# Patient Record
Sex: FEMALE | Race: ASIAN | NOT HISPANIC OR LATINO | URBAN - METROPOLITAN AREA
[De-identification: names, ages, dates, MRNs, and addresses within clinical notes are randomized per-mention and may not be internally consistent; named-entity substitution may affect disease eponyms.]

---

## 2018-12-12 ENCOUNTER — OUTPATIENT (OUTPATIENT)
Dept: OUTPATIENT SERVICES | Facility: HOSPITAL | Age: 28
LOS: 1 days | End: 2018-12-12
Payer: COMMERCIAL

## 2018-12-12 DIAGNOSIS — Z3A.00 WEEKS OF GESTATION OF PREGNANCY NOT SPECIFIED: ICD-10-CM

## 2018-12-12 DIAGNOSIS — O26.899 OTHER SPECIFIED PREGNANCY RELATED CONDITIONS, UNSPECIFIED TRIMESTER: ICD-10-CM

## 2018-12-12 LAB — AMNISURE ROM (RUPTURE OF MEMBRANES): NEGATIVE — SIGNIFICANT CHANGE UP

## 2018-12-12 PROCEDURE — 94760 N-INVAS EAR/PLS OXIMETRY 1: CPT

## 2018-12-12 PROCEDURE — 99214 OFFICE O/P EST MOD 30 MIN: CPT

## 2018-12-12 PROCEDURE — 76818 FETAL BIOPHYS PROFILE W/NST: CPT

## 2018-12-12 PROCEDURE — 84112 EVAL AMNIOTIC FLUID PROTEIN: CPT

## 2019-01-02 ENCOUNTER — INPATIENT (INPATIENT)
Facility: HOSPITAL | Age: 29
LOS: 1 days | Discharge: HOME | End: 2019-01-04
Attending: OBSTETRICS & GYNECOLOGY | Admitting: OBSTETRICS & GYNECOLOGY
Payer: COMMERCIAL

## 2019-01-02 VITALS — HEIGHT: 64 IN | WEIGHT: 171.96 LBS

## 2019-01-02 LAB
ALBUMIN SERPL ELPH-MCNC: 3.7 G/DL — SIGNIFICANT CHANGE UP (ref 3.3–5)
ALP SERPL-CCNC: 194 U/L — HIGH (ref 40–120)
ALT FLD-CCNC: 14 U/L — SIGNIFICANT CHANGE UP (ref 10–45)
ANION GAP SERPL CALC-SCNC: 16 MMOL/L — SIGNIFICANT CHANGE UP (ref 5–17)
APPEARANCE UR: CLEAR — SIGNIFICANT CHANGE UP
APTT BLD: 28.3 SEC — SIGNIFICANT CHANGE UP (ref 27.5–36.3)
AST SERPL-CCNC: 20 U/L — SIGNIFICANT CHANGE UP (ref 10–40)
BASOPHILS NFR BLD AUTO: 0.3 % — SIGNIFICANT CHANGE UP (ref 0–2)
BILIRUB SERPL-MCNC: 0.3 MG/DL — SIGNIFICANT CHANGE UP (ref 0.2–1.2)
BILIRUB UR-MCNC: NEGATIVE — SIGNIFICANT CHANGE UP
BUN SERPL-MCNC: 6 MG/DL — LOW (ref 7–23)
CALCIUM SERPL-MCNC: 9.7 MG/DL — SIGNIFICANT CHANGE UP (ref 8.4–10.5)
CHLORIDE SERPL-SCNC: 102 MMOL/L — SIGNIFICANT CHANGE UP (ref 96–108)
CO2 SERPL-SCNC: 23 MMOL/L — SIGNIFICANT CHANGE UP (ref 22–31)
COLOR SPEC: YELLOW — SIGNIFICANT CHANGE UP
CREAT ?TM UR-MCNC: 76 MG/DL — SIGNIFICANT CHANGE UP
CREAT SERPL-MCNC: 0.54 MG/DL — SIGNIFICANT CHANGE UP (ref 0.5–1.3)
DIFF PNL FLD: NEGATIVE — SIGNIFICANT CHANGE UP
EOSINOPHIL NFR BLD AUTO: 0.7 % — SIGNIFICANT CHANGE UP (ref 0–6)
FIBRINOGEN PPP-MCNC: 575 MG/DL — HIGH (ref 258–438)
GLUCOSE SERPL-MCNC: 92 MG/DL — SIGNIFICANT CHANGE UP (ref 70–99)
GLUCOSE UR QL: NEGATIVE — SIGNIFICANT CHANGE UP
HCT VFR BLD CALC: 41.2 % — SIGNIFICANT CHANGE UP (ref 34.5–45)
HGB BLD-MCNC: 13.8 G/DL — SIGNIFICANT CHANGE UP (ref 11.5–15.5)
INR BLD: 0.83 — LOW (ref 0.88–1.16)
KETONES UR-MCNC: NEGATIVE — SIGNIFICANT CHANGE UP
LDH SERPL L TO P-CCNC: 210 U/L — SIGNIFICANT CHANGE UP (ref 50–242)
LEUKOCYTE ESTERASE UR-ACNC: ABNORMAL
LYMPHOCYTES # BLD AUTO: 13.5 % — SIGNIFICANT CHANGE UP (ref 13–44)
MCHC RBC-ENTMCNC: 29.1 PG — SIGNIFICANT CHANGE UP (ref 27–34)
MCHC RBC-ENTMCNC: 33.5 G/DL — SIGNIFICANT CHANGE UP (ref 32–36)
MCV RBC AUTO: 86.9 FL — SIGNIFICANT CHANGE UP (ref 80–100)
MONOCYTES NFR BLD AUTO: 6.8 % — SIGNIFICANT CHANGE UP (ref 2–14)
NEUTROPHILS NFR BLD AUTO: 78.7 % — HIGH (ref 43–77)
NITRITE UR-MCNC: NEGATIVE — SIGNIFICANT CHANGE UP
PH UR: 7.5 — SIGNIFICANT CHANGE UP (ref 5–8)
PLATELET # BLD AUTO: 192 K/UL — SIGNIFICANT CHANGE UP (ref 150–400)
POTASSIUM SERPL-MCNC: 4.2 MMOL/L — SIGNIFICANT CHANGE UP (ref 3.5–5.3)
POTASSIUM SERPL-SCNC: 4.2 MMOL/L — SIGNIFICANT CHANGE UP (ref 3.5–5.3)
PROT ?TM UR-MCNC: 18 MG/DL — HIGH (ref 0–12)
PROT SERPL-MCNC: 7.2 G/DL — SIGNIFICANT CHANGE UP (ref 6–8.3)
PROT UR-MCNC: NEGATIVE MG/DL — SIGNIFICANT CHANGE UP
PROT/CREAT UR-RTO: 0.2 RATIO — SIGNIFICANT CHANGE UP (ref 0–0.2)
PROTHROM AB SERPL-ACNC: 9.3 SEC — LOW (ref 10–12.9)
RBC # BLD: 4.74 M/UL — SIGNIFICANT CHANGE UP (ref 3.8–5.2)
RBC # FLD: 14.5 % — SIGNIFICANT CHANGE UP (ref 10.3–16.9)
RH IG SCN BLD-IMP: POSITIVE — SIGNIFICANT CHANGE UP
SODIUM SERPL-SCNC: 141 MMOL/L — SIGNIFICANT CHANGE UP (ref 135–145)
SP GR SPEC: 1.02 — SIGNIFICANT CHANGE UP (ref 1–1.03)
URATE SERPL-MCNC: 4.4 MG/DL — SIGNIFICANT CHANGE UP (ref 2.5–7)
UROBILINOGEN FLD QL: 1 E.U./DL — SIGNIFICANT CHANGE UP
WBC # BLD: 10 K/UL — SIGNIFICANT CHANGE UP (ref 3.8–10.5)
WBC # FLD AUTO: 10 K/UL — SIGNIFICANT CHANGE UP (ref 3.8–10.5)

## 2019-01-02 RX ORDER — OXYTOCIN 10 UNIT/ML
333.33 VIAL (ML) INJECTION
Qty: 20 | Refills: 0 | Status: DISCONTINUED | OUTPATIENT
Start: 2019-01-02 | End: 2019-01-03

## 2019-01-02 RX ORDER — OXYTOCIN 10 UNIT/ML
1 VIAL (ML) INJECTION
Qty: 30 | Refills: 0 | Status: DISCONTINUED | OUTPATIENT
Start: 2019-01-02 | End: 2019-01-02

## 2019-01-02 RX ORDER — PENICILLIN G POTASSIUM 5000000 [IU]/1
2.5 POWDER, FOR SOLUTION INTRAMUSCULAR; INTRAPLEURAL; INTRATHECAL; INTRAVENOUS EVERY 4 HOURS
Qty: 0 | Refills: 0 | Status: DISCONTINUED | OUTPATIENT
Start: 2019-01-02 | End: 2019-01-03

## 2019-01-02 RX ORDER — FENTANYL/BUPIVACAINE/NS/PF 2MCG/ML-.1
250 PLASTIC BAG, INJECTION (ML) INJECTION
Qty: 0 | Refills: 0 | Status: DISCONTINUED | OUTPATIENT
Start: 2019-01-02 | End: 2019-01-02

## 2019-01-02 RX ORDER — SODIUM CHLORIDE 9 MG/ML
1000 INJECTION, SOLUTION INTRAVENOUS ONCE
Qty: 0 | Refills: 0 | Status: DISCONTINUED | OUTPATIENT
Start: 2019-01-02 | End: 2019-01-03

## 2019-01-02 RX ORDER — PENICILLIN G POTASSIUM 5000000 [IU]/1
5 POWDER, FOR SOLUTION INTRAMUSCULAR; INTRAPLEURAL; INTRATHECAL; INTRAVENOUS ONCE
Qty: 0 | Refills: 0 | Status: COMPLETED | OUTPATIENT
Start: 2019-01-02 | End: 2019-01-02

## 2019-01-02 RX ORDER — PENICILLIN G POTASSIUM 5000000 [IU]/1
POWDER, FOR SOLUTION INTRAMUSCULAR; INTRAPLEURAL; INTRATHECAL; INTRAVENOUS
Qty: 0 | Refills: 0 | Status: DISCONTINUED | OUTPATIENT
Start: 2019-01-02 | End: 2019-01-03

## 2019-01-02 RX ORDER — OXYTOCIN 10 UNIT/ML
1 VIAL (ML) INJECTION
Qty: 30 | Refills: 0 | Status: DISCONTINUED | OUTPATIENT
Start: 2019-01-02 | End: 2019-01-04

## 2019-01-02 RX ORDER — SODIUM CHLORIDE 9 MG/ML
1000 INJECTION, SOLUTION INTRAVENOUS
Qty: 0 | Refills: 0 | Status: DISCONTINUED | OUTPATIENT
Start: 2019-01-02 | End: 2019-01-03

## 2019-01-02 RX ADMIN — PENICILLIN G POTASSIUM 100 MILLION UNIT(S): 5000000 POWDER, FOR SOLUTION INTRAMUSCULAR; INTRAPLEURAL; INTRATHECAL; INTRAVENOUS at 21:04

## 2019-01-02 RX ADMIN — PENICILLIN G POTASSIUM 100 MILLION UNIT(S): 5000000 POWDER, FOR SOLUTION INTRAMUSCULAR; INTRAPLEURAL; INTRATHECAL; INTRAVENOUS at 13:06

## 2019-01-02 RX ADMIN — PENICILLIN G POTASSIUM 100 MILLION UNIT(S): 5000000 POWDER, FOR SOLUTION INTRAMUSCULAR; INTRAPLEURAL; INTRATHECAL; INTRAVENOUS at 17:10

## 2019-01-02 RX ADMIN — SODIUM CHLORIDE 125 MILLILITER(S): 9 INJECTION, SOLUTION INTRAVENOUS at 13:16

## 2019-01-02 RX ADMIN — Medication 1 MILLIUNIT(S)/MIN: at 13:15

## 2019-01-03 ENCOUNTER — TRANSCRIPTION ENCOUNTER (OUTPATIENT)
Age: 29
End: 2019-01-03

## 2019-01-03 LAB — T PALLIDUM AB TITR SER: NEGATIVE — SIGNIFICANT CHANGE UP

## 2019-01-03 RX ORDER — HYDROCORTISONE 1 %
1 OINTMENT (GRAM) TOPICAL EVERY 4 HOURS
Qty: 0 | Refills: 0 | Status: DISCONTINUED | OUTPATIENT
Start: 2019-01-03 | End: 2019-01-04

## 2019-01-03 RX ORDER — SIMETHICONE 80 MG/1
80 TABLET, CHEWABLE ORAL EVERY 6 HOURS
Qty: 0 | Refills: 0 | Status: DISCONTINUED | OUTPATIENT
Start: 2019-01-03 | End: 2019-01-04

## 2019-01-03 RX ORDER — IBUPROFEN 200 MG
600 TABLET ORAL EVERY 6 HOURS
Qty: 0 | Refills: 0 | Status: DISCONTINUED | OUTPATIENT
Start: 2019-01-03 | End: 2019-01-04

## 2019-01-03 RX ORDER — PRAMOXINE HYDROCHLORIDE 150 MG/15G
1 AEROSOL, FOAM RECTAL EVERY 4 HOURS
Qty: 0 | Refills: 0 | Status: DISCONTINUED | OUTPATIENT
Start: 2019-01-03 | End: 2019-01-04

## 2019-01-03 RX ORDER — DIPHENHYDRAMINE HCL 50 MG
25 CAPSULE ORAL EVERY 6 HOURS
Qty: 0 | Refills: 0 | Status: DISCONTINUED | OUTPATIENT
Start: 2019-01-03 | End: 2019-01-04

## 2019-01-03 RX ORDER — OXYTOCIN 10 UNIT/ML
41.67 VIAL (ML) INJECTION
Qty: 20 | Refills: 0 | Status: DISCONTINUED | OUTPATIENT
Start: 2019-01-03 | End: 2019-01-04

## 2019-01-03 RX ORDER — MAGNESIUM HYDROXIDE 400 MG/1
30 TABLET, CHEWABLE ORAL
Qty: 0 | Refills: 0 | Status: DISCONTINUED | OUTPATIENT
Start: 2019-01-03 | End: 2019-01-04

## 2019-01-03 RX ORDER — TETANUS TOXOID, REDUCED DIPHTHERIA TOXOID AND ACELLULAR PERTUSSIS VACCINE, ADSORBED 5; 2.5; 8; 8; 2.5 [IU]/.5ML; [IU]/.5ML; UG/.5ML; UG/.5ML; UG/.5ML
0.5 SUSPENSION INTRAMUSCULAR ONCE
Qty: 0 | Refills: 0 | Status: DISCONTINUED | OUTPATIENT
Start: 2019-01-03 | End: 2019-01-04

## 2019-01-03 RX ORDER — OXYCODONE AND ACETAMINOPHEN 5; 325 MG/1; MG/1
2 TABLET ORAL EVERY 6 HOURS
Qty: 0 | Refills: 0 | Status: DISCONTINUED | OUTPATIENT
Start: 2019-01-03 | End: 2019-01-04

## 2019-01-03 RX ORDER — SODIUM CHLORIDE 9 MG/ML
3 INJECTION INTRAMUSCULAR; INTRAVENOUS; SUBCUTANEOUS EVERY 8 HOURS
Qty: 0 | Refills: 0 | Status: DISCONTINUED | OUTPATIENT
Start: 2019-01-03 | End: 2019-01-04

## 2019-01-03 RX ORDER — GLYCERIN ADULT
1 SUPPOSITORY, RECTAL RECTAL AT BEDTIME
Qty: 0 | Refills: 0 | Status: DISCONTINUED | OUTPATIENT
Start: 2019-01-03 | End: 2019-01-04

## 2019-01-03 RX ORDER — DOCUSATE SODIUM 100 MG
100 CAPSULE ORAL
Qty: 0 | Refills: 0 | Status: DISCONTINUED | OUTPATIENT
Start: 2019-01-03 | End: 2019-01-04

## 2019-01-03 RX ORDER — DIBUCAINE 1 %
1 OINTMENT (GRAM) RECTAL EVERY 4 HOURS
Qty: 0 | Refills: 0 | Status: DISCONTINUED | OUTPATIENT
Start: 2019-01-03 | End: 2019-01-04

## 2019-01-03 RX ORDER — ACETAMINOPHEN 500 MG
650 TABLET ORAL EVERY 6 HOURS
Qty: 0 | Refills: 0 | Status: DISCONTINUED | OUTPATIENT
Start: 2019-01-03 | End: 2019-01-04

## 2019-01-03 RX ORDER — AER TRAVELER 0.5 G/1
1 SOLUTION RECTAL; TOPICAL EVERY 4 HOURS
Qty: 0 | Refills: 0 | Status: DISCONTINUED | OUTPATIENT
Start: 2019-01-03 | End: 2019-01-04

## 2019-01-03 RX ORDER — LANOLIN
1 OINTMENT (GRAM) TOPICAL EVERY 6 HOURS
Qty: 0 | Refills: 0 | Status: DISCONTINUED | OUTPATIENT
Start: 2019-01-03 | End: 2019-01-04

## 2019-01-03 RX ADMIN — OXYCODONE AND ACETAMINOPHEN 2 TABLET(S): 5; 325 TABLET ORAL at 08:10

## 2019-01-03 RX ADMIN — Medication 125 MILLIUNIT(S)/MIN: at 01:44

## 2019-01-03 RX ADMIN — SODIUM CHLORIDE 3 MILLILITER(S): 9 INJECTION INTRAMUSCULAR; INTRAVENOUS; SUBCUTANEOUS at 06:06

## 2019-01-03 RX ADMIN — Medication 600 MILLIGRAM(S): at 18:00

## 2019-01-03 RX ADMIN — Medication 600 MILLIGRAM(S): at 06:19

## 2019-01-03 RX ADMIN — Medication 1 APPLICATION(S): at 06:19

## 2019-01-03 RX ADMIN — Medication 600 MILLIGRAM(S): at 07:10

## 2019-01-03 RX ADMIN — OXYCODONE AND ACETAMINOPHEN 2 TABLET(S): 5; 325 TABLET ORAL at 22:00

## 2019-01-03 RX ADMIN — SODIUM CHLORIDE 3 MILLILITER(S): 9 INJECTION INTRAMUSCULAR; INTRAVENOUS; SUBCUTANEOUS at 14:59

## 2019-01-03 RX ADMIN — OXYCODONE AND ACETAMINOPHEN 2 TABLET(S): 5; 325 TABLET ORAL at 08:52

## 2019-01-03 RX ADMIN — Medication 600 MILLIGRAM(S): at 13:28

## 2019-01-03 RX ADMIN — Medication 1 TABLET(S): at 12:28

## 2019-01-03 RX ADMIN — OXYCODONE AND ACETAMINOPHEN 2 TABLET(S): 5; 325 TABLET ORAL at 21:12

## 2019-01-03 RX ADMIN — Medication 600 MILLIGRAM(S): at 12:28

## 2019-01-03 RX ADMIN — Medication 100 MILLIGRAM(S): at 21:12

## 2019-01-03 RX ADMIN — Medication 100 MILLIGRAM(S): at 12:28

## 2019-01-03 NOTE — PROGRESS NOTE ADULT - SUBJECTIVE AND OBJECTIVE BOX
Patient evaluated at bedside.   She reports pain is well controlled with po pain meds  She denies headache, dizziness, chest pain, palpitations, shortness of breathe, nausea, vomiting, heavy vaginal bleeding or perineal discomfort.  She has been ambulating without assistance, voiding spontaneously, Feels soreness in perineum    Physical Exam:  Vital Signs Last 24 Hrs  T(C): 36.8 (03 Jan 2019 06:12), Max: 37.6 (03 Jan 2019 00:15)  T(F): 98.2 (03 Jan 2019 06:12), Max: 99.7 (03 Jan 2019 00:15)  HR: 79 (03 Jan 2019 06:12) (77 - 92)  BP: 109/70 (03 Jan 2019 06:12) (97/75 - 116/65)  BP(mean): --  RR: 17 (03 Jan 2019 06:12) (17 - 20)  SpO2: 99% (03 Jan 2019 06:12) (98% - 99%)    GA: NAD, A+0 x 3  Abd: + BS, soft, nontender, nondistended, no rebound or guarding, uterus firm at midline,   fb below umbilicus  : lochia WNL  Extremities: no swelling or calf tenderness, reflexes +2 bilaterally                          13.8   10.0  )-----------( 192      ( 02 Jan 2019 12:57 )             41.2     01-02    141  |  102  |  6<L>  ----------------------------<  92  4.2   |  23  |  0.54    Ca    9.7      02 Jan 2019 12:57    TPro  7.2  /  Alb  3.7  /  TBili  0.3  /  DBili  x   /  AST  20  /  ALT  14  /  AlkPhos  194<H>  01-02    PT/INR - ( 02 Jan 2019 12:57 )   PT: 9.3 sec;   INR: 0.83          PTT - ( 02 Jan 2019 12:57 )  PTT:28.3 sec

## 2019-01-03 NOTE — DISCHARGE NOTE OB - CARE PROVIDER_API CALL
Bety Bowser (DO; MS), Obstetrics and Gynecology  [address] [place] [address]  Phone: [phone]  Fax: [phone]

## 2019-01-03 NOTE — DISCHARGE NOTE OB - PATIENT PORTAL LINK FT
You can access the [facility] Patient Portal, offered by [facility], by registering with the following website: [email or web address]

## 2019-01-03 NOTE — DISCHARGE NOTE OB - PLAN OF CARE
discharge home patient to be discharged home. Nothing per vagina, no tampons, tub baths, intercourse. Patient to notify provider for fever >101, heavy vaginal bleeding, extreme abdominal pain. Patient to follow up in office in 6w

## 2019-01-03 NOTE — DISCHARGE NOTE OB - MEDICATION SUMMARY - MEDICATIONS TO TAKE
I will START or STAY ON the medications listed below when I get home from the hospital:    acetaminophen 325 mg oral tablet  -- 2 tab(s) by mouth every 6 hours, As needed, Temp greater or equal to 38.5C (101.3F), Mild Pain (1 - 3)  -- Indication: For Pain    ibuprofen 600 mg oral tablet  -- 1 tab(s) by mouth every 6 hours, As needed, Moderate Pain (4 - 6)  -- Indication: For Pain    lanolin topical ointment  -- 1 application on skin every 6 hours, As needed, Sore Nipples  -- Indication: For Breastfeeding    Prenatal 1 oral capsule  -- Indication: For Postpartum state    docusate sodium 100 mg oral capsule  -- 1 cap(s) by mouth 2 times a day, As needed, Stool Softening  -- Indication: For Constipation    simethicone 80 mg oral tablet, chewable  -- 1 tab(s) by mouth every 6 hours, As needed, Gas  -- Indication: For Gas

## 2019-01-03 NOTE — DISCHARGE NOTE OB - CARE PLAN
Principal Discharge DX:	Postpartum state  Goal:	discharge home  Assessment and plan of treatment:	patient to be discharged home. Nothing per vagina, no tampons, tub baths, intercourse. Patient to notify provider for fever >101, heavy vaginal bleeding, extreme abdominal pain. Patient to follow up in office in 6w

## 2019-01-04 VITALS
HEART RATE: 92 BPM | SYSTOLIC BLOOD PRESSURE: 146 MMHG | RESPIRATION RATE: 18 BRPM | DIASTOLIC BLOOD PRESSURE: 78 MMHG | TEMPERATURE: 99 F | OXYGEN SATURATION: 97 %

## 2019-01-04 LAB — SURGICAL PATHOLOGY STUDY: SIGNIFICANT CHANGE UP

## 2019-01-04 PROCEDURE — 36415 COLL VENOUS BLD VENIPUNCTURE: CPT

## 2019-01-04 PROCEDURE — 80053 COMPREHEN METABOLIC PANEL: CPT

## 2019-01-04 PROCEDURE — 85730 THROMBOPLASTIN TIME PARTIAL: CPT

## 2019-01-04 PROCEDURE — 86900 BLOOD TYPING SEROLOGIC ABO: CPT

## 2019-01-04 PROCEDURE — 83615 LACTATE (LD) (LDH) ENZYME: CPT

## 2019-01-04 PROCEDURE — 85025 COMPLETE CBC W/AUTO DIFF WBC: CPT

## 2019-01-04 PROCEDURE — 81001 URINALYSIS AUTO W/SCOPE: CPT

## 2019-01-04 PROCEDURE — 84550 ASSAY OF BLOOD/URIC ACID: CPT

## 2019-01-04 PROCEDURE — 86901 BLOOD TYPING SEROLOGIC RH(D): CPT

## 2019-01-04 PROCEDURE — 86780 TREPONEMA PALLIDUM: CPT

## 2019-01-04 PROCEDURE — 85610 PROTHROMBIN TIME: CPT

## 2019-01-04 PROCEDURE — 84156 ASSAY OF PROTEIN URINE: CPT

## 2019-01-04 PROCEDURE — 86850 RBC ANTIBODY SCREEN: CPT

## 2019-01-04 PROCEDURE — 88307 TISSUE EXAM BY PATHOLOGIST: CPT

## 2019-01-04 PROCEDURE — 85384 FIBRINOGEN ACTIVITY: CPT

## 2019-01-04 PROCEDURE — 82570 ASSAY OF URINE CREATININE: CPT

## 2019-01-04 RX ORDER — LANOLIN
1 OINTMENT (GRAM) TOPICAL
Qty: 0 | Refills: 0 | DISCHARGE
Start: 2019-01-04

## 2019-01-04 RX ORDER — DOCUSATE SODIUM 100 MG
1 CAPSULE ORAL
Qty: 0 | Refills: 0 | DISCHARGE
Start: 2019-01-04

## 2019-01-04 RX ORDER — ACETAMINOPHEN 500 MG
2 TABLET ORAL
Qty: 0 | Refills: 0 | DISCHARGE
Start: 2019-01-04

## 2019-01-04 RX ORDER — IBUPROFEN 200 MG
1 TABLET ORAL
Qty: 0 | Refills: 0 | DISCHARGE
Start: 2019-01-04

## 2019-01-04 RX ORDER — SIMETHICONE 80 MG/1
1 TABLET, CHEWABLE ORAL
Qty: 0 | Refills: 0 | DISCHARGE
Start: 2019-01-04

## 2019-01-04 RX ADMIN — Medication 600 MILLIGRAM(S): at 06:13

## 2019-01-04 RX ADMIN — Medication 600 MILLIGRAM(S): at 00:16

## 2019-01-04 RX ADMIN — Medication 600 MILLIGRAM(S): at 07:05

## 2019-01-04 RX ADMIN — Medication 1 TABLET(S): at 14:14

## 2019-01-04 RX ADMIN — Medication 600 MILLIGRAM(S): at 01:07

## 2019-01-04 NOTE — PROGRESS NOTE ADULT - ASSESSMENT
A/P yo 28y  s/p , PP#1 , stable  1. Pain: OPM  2. GI: Reg  3. :  Voiding  4. DVT prophylaxis: SCDs, ambulation  5. Dispo: PP#2 A/P yo 28y  s/p , PP#2 , stable  1. Pain: OPM  2. GI: Reg  3. :  Voiding  4. DVT prophylaxis: SCDs, ambulation  5. Dispo: PP#2

## 2019-01-04 NOTE — PROGRESS NOTE ADULT - SUBJECTIVE AND OBJECTIVE BOX
Patient evaluated at bedside.     She reports pain is well controlled.    She has been ambulating without assistance, voiding spontaneously, and is breastfeeding.    She denies HA, dizziness, chest pain, palpitations, shortness of breath, n/v, heavy vaginal bleeding or perineal discomfort.    Physical Exam:  Vital Signs Last 24 Hrs  T(C): 36.8 (04 Jan 2019 06:00), Max: 36.8 (04 Jan 2019 06:00)  T(F): 98.3 (04 Jan 2019 06:00), Max: 98.3 (04 Jan 2019 06:00)  HR: 82 (04 Jan 2019 06:00) (76 - 82)  BP: 98/59 (04 Jan 2019 06:00) (98/59 - 112/72)  BP(mean): --  RR: 16 (04 Jan 2019 06:00) (16 - 16)  SpO2: 97% (04 Jan 2019 06:00) (97% - 98%)    GA: NAD, A+0 x 3  Abd: + BS, soft, nontender, nondistended, no rebound or guarding, uterus firm at midline  : lochia WNL  Extremities: no edema or calf tenderness                          13.8   10.0  )-----------( 192      ( 02 Jan 2019 12:57 )             41.2     01-02    141  |  102  |  6<L>  ----------------------------<  92  4.2   |  23  |  0.54    Ca    9.7      02 Jan 2019 12:57    TPro  7.2  /  Alb  3.7  /  TBili  0.3  /  DBili  x   /  AST  20  /  ALT  14  /  AlkPhos  194<H>  01-02    PT/INR - ( 02 Jan 2019 12:57 )   PT: 9.3 sec;   INR: 0.83          PTT - ( 02 Jan 2019 12:57 )  PTT:28.3 sec

## 2019-01-04 NOTE — PROGRESS NOTE ADULT - ATTENDING COMMENTS
Patient seen and examined at bedside. Agree with above evaluation. Plan for discharge today PPD2 if patient remains hemodynamically stable

## 2019-01-07 DIAGNOSIS — Z3A.39 39 WEEKS GESTATION OF PREGNANCY: ICD-10-CM

## 2023-10-03 ENCOUNTER — APPOINTMENT (OUTPATIENT)
Dept: ANTEPARTUM | Facility: CLINIC | Age: 33
End: 2023-10-03
Payer: COMMERCIAL

## 2023-10-03 ENCOUNTER — TRANSCRIPTION ENCOUNTER (OUTPATIENT)
Age: 33
End: 2023-10-03

## 2023-10-03 ENCOUNTER — ASOB RESULT (OUTPATIENT)
Age: 33
End: 2023-10-03

## 2023-10-03 PROCEDURE — 76813 OB US NUCHAL MEAS 1 GEST: CPT

## 2023-10-03 PROCEDURE — 36415 COLL VENOUS BLD VENIPUNCTURE: CPT

## 2023-10-03 PROCEDURE — 93976 VASCULAR STUDY: CPT

## 2023-11-01 ENCOUNTER — APPOINTMENT (OUTPATIENT)
Dept: ANTEPARTUM | Facility: CLINIC | Age: 33
End: 2023-11-01
Payer: COMMERCIAL

## 2023-11-01 ENCOUNTER — ASOB RESULT (OUTPATIENT)
Age: 33
End: 2023-11-01

## 2023-11-01 PROCEDURE — 76805 OB US >/= 14 WKS SNGL FETUS: CPT

## 2023-12-05 ENCOUNTER — ASOB RESULT (OUTPATIENT)
Age: 33
End: 2023-12-05

## 2023-12-05 ENCOUNTER — APPOINTMENT (OUTPATIENT)
Dept: ANTEPARTUM | Facility: CLINIC | Age: 33
End: 2023-12-05
Payer: COMMERCIAL

## 2023-12-05 PROCEDURE — 76811 OB US DETAILED SNGL FETUS: CPT

## 2023-12-28 PROBLEM — Z00.00 ENCOUNTER FOR PREVENTIVE HEALTH EXAMINATION: Status: ACTIVE | Noted: 2023-12-28

## 2024-01-29 ENCOUNTER — APPOINTMENT (OUTPATIENT)
Dept: MATERNAL FETAL MEDICINE | Facility: CLINIC | Age: 34
End: 2024-01-29
Payer: COMMERCIAL

## 2024-01-29 PROCEDURE — 99205 OFFICE O/P NEW HI 60 MIN: CPT | Mod: 95

## 2024-01-29 NOTE — DISCUSSION/SUMMARY
[FreeTextEntry1] : Gestational Diabetes: The patient was counseled that maintaining good glycemic control is the key intervention for reducing the frequency and/or severity of complications related to gestational diabetes mellitus (GDM). We reviewed the risks associated with GDM including macrosomia/LGA infant, preeclampsia, polyhydramnios, stillbirth and  morbidities (such as hypoglycemia, hyperbilirubinemia, electrolyte abnormalities, polycythemia, respiratory distress and cardiomyopathy). Risks associated with GDM beyond the pregnancy and  period were also briefly discussed including development of obesity, impaired glucose tolerance, or metabolic syndrome. Fortunately, with good glucose control in pregnancy all these risks can be reduced to near non-GDM levels. GDM is also a strong marker for maternal development of type 2 diabetes. She was counseled that lifestyle interventions, which she is already instituting, such as dietary changes and exercise can reduce her lifetime risk of DM. Exercise in pregnancy was encouraged, and follow-up with the nutritionist as needed.   Recommendations: There is no current indication for insulin therapy. The patient was referred to a nutritionist. The patient will follow up with me in two weeks for review of her glycemic control

## 2024-01-29 NOTE — HISTORY OF PRESENT ILLNESS
[Home] : at home, [unfilled] , at the time of the visit. [Medical Office: ([facility])___] : at the medical office located in  [Verbal consent obtained from patient] : the patient, [unfilled] [FreeTextEntry1] :  Ms. Huggins is a 32 yo  at 28 4/7 wga (ANA 4/15/24) presents for MFM consultation. Her self reported ethnicity is .  Her OB history is as follows: G1: , term, 8lbs 4 oz G2: current  She has a recent diagnosis of GDM and has been tracking her glycemic control for three weeks. She is tracking 2 hours after meals. She reports optimal control with her fasting and post prandial values. She is not currently working with a nutritionist.

## 2024-02-06 ENCOUNTER — APPOINTMENT (OUTPATIENT)
Dept: ANTEPARTUM | Facility: CLINIC | Age: 34
End: 2024-02-06
Payer: COMMERCIAL

## 2024-02-06 ENCOUNTER — ASOB RESULT (OUTPATIENT)
Age: 34
End: 2024-02-06

## 2024-02-06 DIAGNOSIS — O24.419 GESTATIONAL DIABETES MELLITUS IN PREGNANCY, UNSPECIFIED CONTROL: ICD-10-CM

## 2024-02-06 PROCEDURE — 76816 OB US FOLLOW-UP PER FETUS: CPT

## 2024-02-06 PROCEDURE — 76819 FETAL BIOPHYS PROFIL W/O NST: CPT

## 2024-02-06 PROCEDURE — 76820 UMBILICAL ARTERY ECHO: CPT | Mod: 59

## 2024-02-12 ENCOUNTER — APPOINTMENT (OUTPATIENT)
Age: 34
End: 2024-02-12

## 2024-02-13 ENCOUNTER — APPOINTMENT (OUTPATIENT)
Dept: MATERNAL FETAL MEDICINE | Facility: CLINIC | Age: 34
End: 2024-02-13
Payer: COMMERCIAL

## 2024-02-13 PROCEDURE — 99213 OFFICE O/P EST LOW 20 MIN: CPT | Mod: 95

## 2024-03-01 NOTE — DISCUSSION/SUMMARY
[FreeTextEntry1] :  Recommendations: There is no current indication for insulin therapy. The patient was referred to a nutritionist.

## 2024-03-01 NOTE — HISTORY OF PRESENT ILLNESS
[Home] : at home, [unfilled] , at the time of the visit. [Verbal consent obtained from patient] : the patient, [unfilled] [Medical Office: ([facility])___] : at the medical office located in  [FreeTextEntry1] :  Ms. Huggins is a 34 yo  at 30 5/7 wga (ANA 4/15/24) presents for MFM consultation. Her self reported ethnicity is .  Her OB history is as follows: G1: , term, 8lbs 4 oz G2: current  She has a recent diagnosis of GDM. She is tracking 2 hours after meals. She reports optimal control with her fasting and post prandial values. She is not currently working with a nutritionist.

## 2024-03-05 ENCOUNTER — APPOINTMENT (OUTPATIENT)
Dept: MATERNAL FETAL MEDICINE | Facility: CLINIC | Age: 34
End: 2024-03-05
Payer: COMMERCIAL

## 2024-03-05 PROCEDURE — 99214 OFFICE O/P EST MOD 30 MIN: CPT | Mod: 95

## 2024-03-05 NOTE — DISCUSSION/SUMMARY
[FreeTextEntry1] :  Recommendations: There is no current indication for insulin therapy. Planning for IOL at 39 wga.

## 2024-03-05 NOTE — HISTORY OF PRESENT ILLNESS
[Medical Office: ([facility])___] : at the medical office located in  [Home] : at home, [unfilled] , at the time of the visit. [Verbal consent obtained from patient] : the patient, [unfilled] [FreeTextEntry1] :  Ms. Huggins is a 32 yo  at 34 1/7 wga (ANA 4/15/24) presents for MFM consultation. Her self reported ethnicity is .  Her OB history is as follows: G1: , term, 8lbs 4 oz G2: current  She reports adequate glycemic control. She is not currently working with a nutritionist.

## 2024-03-19 ENCOUNTER — APPOINTMENT (OUTPATIENT)
Dept: ANTEPARTUM | Facility: CLINIC | Age: 34
End: 2024-03-19
Payer: COMMERCIAL

## 2024-03-19 ENCOUNTER — ASOB RESULT (OUTPATIENT)
Age: 34
End: 2024-03-19

## 2024-03-19 PROCEDURE — 76819 FETAL BIOPHYS PROFIL W/O NST: CPT

## 2024-03-19 PROCEDURE — 76820 UMBILICAL ARTERY ECHO: CPT | Mod: 59

## 2024-03-19 PROCEDURE — 76816 OB US FOLLOW-UP PER FETUS: CPT

## 2024-03-31 ENCOUNTER — INPATIENT (INPATIENT)
Facility: HOSPITAL | Age: 34
LOS: 1 days | Discharge: HOME | End: 2024-04-02
Attending: OBSTETRICS & GYNECOLOGY | Admitting: OBSTETRICS & GYNECOLOGY
Payer: COMMERCIAL

## 2024-03-31 VITALS
TEMPERATURE: 98 F | SYSTOLIC BLOOD PRESSURE: 124 MMHG | DIASTOLIC BLOOD PRESSURE: 77 MMHG | RESPIRATION RATE: 16 BRPM | HEART RATE: 89 BPM

## 2024-03-31 DIAGNOSIS — Z98.890 OTHER SPECIFIED POSTPROCEDURAL STATES: Chronic | ICD-10-CM

## 2024-03-31 DIAGNOSIS — O26.899 OTHER SPECIFIED PREGNANCY RELATED CONDITIONS, UNSPECIFIED TRIMESTER: ICD-10-CM

## 2024-03-31 LAB
BASOPHILS # BLD AUTO: 0.05 K/UL — SIGNIFICANT CHANGE UP (ref 0–0.2)
BASOPHILS NFR BLD AUTO: 0.4 % — SIGNIFICANT CHANGE UP (ref 0–2)
BLD GP AB SCN SERPL QL: NEGATIVE — SIGNIFICANT CHANGE UP
EOSINOPHIL # BLD AUTO: 0.07 K/UL — SIGNIFICANT CHANGE UP (ref 0–0.5)
EOSINOPHIL NFR BLD AUTO: 0.6 % — SIGNIFICANT CHANGE UP (ref 0–6)
GLUCOSE BLDC GLUCOMTR-MCNC: 90 MG/DL — SIGNIFICANT CHANGE UP (ref 70–99)
HCT VFR BLD CALC: 39.4 % — SIGNIFICANT CHANGE UP (ref 34.5–45)
HGB BLD-MCNC: 13.4 G/DL — SIGNIFICANT CHANGE UP (ref 11.5–15.5)
IMM GRANULOCYTES NFR BLD AUTO: 1 % — HIGH (ref 0–0.9)
LYMPHOCYTES # BLD AUTO: 1.59 K/UL — SIGNIFICANT CHANGE UP (ref 1–3.3)
LYMPHOCYTES # BLD AUTO: 13.8 % — SIGNIFICANT CHANGE UP (ref 13–44)
MCHC RBC-ENTMCNC: 28.9 PG — SIGNIFICANT CHANGE UP (ref 27–34)
MCHC RBC-ENTMCNC: 34 GM/DL — SIGNIFICANT CHANGE UP (ref 32–36)
MCV RBC AUTO: 85.1 FL — SIGNIFICANT CHANGE UP (ref 80–100)
MONOCYTES # BLD AUTO: 0.82 K/UL — SIGNIFICANT CHANGE UP (ref 0–0.9)
MONOCYTES NFR BLD AUTO: 7.1 % — SIGNIFICANT CHANGE UP (ref 2–14)
NEUTROPHILS # BLD AUTO: 8.86 K/UL — HIGH (ref 1.8–7.4)
NEUTROPHILS NFR BLD AUTO: 77.1 % — HIGH (ref 43–77)
NRBC # BLD: 0 /100 WBCS — SIGNIFICANT CHANGE UP (ref 0–0)
PLATELET # BLD AUTO: 201 K/UL — SIGNIFICANT CHANGE UP (ref 150–400)
RBC # BLD: 4.63 M/UL — SIGNIFICANT CHANGE UP (ref 3.8–5.2)
RBC # FLD: 15.1 % — HIGH (ref 10.3–14.5)
RH IG SCN BLD-IMP: POSITIVE — SIGNIFICANT CHANGE UP
RH IG SCN BLD-IMP: POSITIVE — SIGNIFICANT CHANGE UP
WBC # BLD: 11.5 K/UL — HIGH (ref 3.8–10.5)
WBC # FLD AUTO: 11.5 K/UL — HIGH (ref 3.8–10.5)

## 2024-03-31 RX ORDER — ALBUTEROL 90 UG/1
2 AEROSOL, METERED ORAL EVERY 6 HOURS
Refills: 0 | Status: DISCONTINUED | OUTPATIENT
Start: 2024-03-31 | End: 2024-04-02

## 2024-03-31 RX ORDER — TIOTROPIUM BROMIDE 18 UG/1
2 CAPSULE ORAL; RESPIRATORY (INHALATION) DAILY
Refills: 0 | Status: DISCONTINUED | OUTPATIENT
Start: 2024-03-31 | End: 2024-04-02

## 2024-03-31 RX ORDER — CETIRIZINE HYDROCHLORIDE 10 MG/1
1 TABLET ORAL
Refills: 0 | DISCHARGE

## 2024-03-31 RX ORDER — CITRIC ACID/SODIUM CITRATE 300-500 MG
15 SOLUTION, ORAL ORAL EVERY 6 HOURS
Refills: 0 | Status: DISCONTINUED | OUTPATIENT
Start: 2024-03-31 | End: 2024-03-31

## 2024-03-31 RX ORDER — SODIUM CHLORIDE 9 MG/ML
1000 INJECTION, SOLUTION INTRAVENOUS
Refills: 0 | Status: DISCONTINUED | OUTPATIENT
Start: 2024-03-31 | End: 2024-03-31

## 2024-03-31 RX ORDER — OXYCODONE HYDROCHLORIDE 5 MG/1
5 TABLET ORAL ONCE
Refills: 0 | Status: DISCONTINUED | OUTPATIENT
Start: 2024-03-31 | End: 2024-04-02

## 2024-03-31 RX ORDER — OXYTOCIN 10 UNIT/ML
333.33 VIAL (ML) INJECTION
Qty: 20 | Refills: 0 | Status: DISCONTINUED | OUTPATIENT
Start: 2024-03-31 | End: 2024-03-31

## 2024-03-31 RX ORDER — DIBUCAINE 1 %
1 OINTMENT (GRAM) RECTAL EVERY 6 HOURS
Refills: 0 | Status: DISCONTINUED | OUTPATIENT
Start: 2024-03-31 | End: 2024-04-02

## 2024-03-31 RX ORDER — CHLORHEXIDINE GLUCONATE 213 G/1000ML
1 SOLUTION TOPICAL DAILY
Refills: 0 | Status: DISCONTINUED | OUTPATIENT
Start: 2024-03-31 | End: 2024-03-31

## 2024-03-31 RX ORDER — SODIUM CHLORIDE 9 MG/ML
3 INJECTION INTRAMUSCULAR; INTRAVENOUS; SUBCUTANEOUS EVERY 8 HOURS
Refills: 0 | Status: DISCONTINUED | OUTPATIENT
Start: 2024-03-31 | End: 2024-04-02

## 2024-03-31 RX ORDER — AER TRAVELER 0.5 G/1
1 SOLUTION RECTAL; TOPICAL EVERY 4 HOURS
Refills: 0 | Status: DISCONTINUED | OUTPATIENT
Start: 2024-03-31 | End: 2024-04-02

## 2024-03-31 RX ORDER — OXYTOCIN 10 UNIT/ML
2 VIAL (ML) INJECTION
Qty: 30 | Refills: 0 | Status: DISCONTINUED | OUTPATIENT
Start: 2024-03-31 | End: 2024-04-02

## 2024-03-31 RX ORDER — BUDESONIDE AND FORMOTEROL FUMARATE DIHYDRATE 160; 4.5 UG/1; UG/1
2 AEROSOL RESPIRATORY (INHALATION)
Refills: 0 | Status: DISCONTINUED | OUTPATIENT
Start: 2024-03-31 | End: 2024-04-02

## 2024-03-31 RX ORDER — MAGNESIUM HYDROXIDE 400 MG/1
30 TABLET, CHEWABLE ORAL
Refills: 0 | Status: DISCONTINUED | OUTPATIENT
Start: 2024-03-31 | End: 2024-04-02

## 2024-03-31 RX ORDER — OXYTOCIN 10 UNIT/ML
41.67 VIAL (ML) INJECTION
Qty: 20 | Refills: 0 | Status: DISCONTINUED | OUTPATIENT
Start: 2024-03-31 | End: 2024-04-02

## 2024-03-31 RX ORDER — ACETAMINOPHEN 500 MG
975 TABLET ORAL
Refills: 0 | Status: DISCONTINUED | OUTPATIENT
Start: 2024-03-31 | End: 2024-04-02

## 2024-03-31 RX ORDER — HYDROCORTISONE 1 %
1 OINTMENT (GRAM) TOPICAL EVERY 6 HOURS
Refills: 0 | Status: DISCONTINUED | OUTPATIENT
Start: 2024-03-31 | End: 2024-04-02

## 2024-03-31 RX ORDER — KETOROLAC TROMETHAMINE 30 MG/ML
30 SYRINGE (ML) INJECTION ONCE
Refills: 0 | Status: DISCONTINUED | OUTPATIENT
Start: 2024-03-31 | End: 2024-03-31

## 2024-03-31 RX ORDER — FLUTICASONE FUROATE, UMECLIDINIUM BROMIDE AND VILANTEROL TRIFENATATE 200; 62.5; 25 UG/1; UG/1; UG/1
1 POWDER RESPIRATORY (INHALATION)
Refills: 0 | DISCHARGE

## 2024-03-31 RX ORDER — SIMETHICONE 80 MG/1
80 TABLET, CHEWABLE ORAL EVERY 4 HOURS
Refills: 0 | Status: DISCONTINUED | OUTPATIENT
Start: 2024-03-31 | End: 2024-04-02

## 2024-03-31 RX ORDER — IBUPROFEN 200 MG
600 TABLET ORAL EVERY 6 HOURS
Refills: 0 | Status: DISCONTINUED | OUTPATIENT
Start: 2024-03-31 | End: 2024-04-02

## 2024-03-31 RX ORDER — BENZOCAINE 10 %
1 GEL (GRAM) MUCOUS MEMBRANE EVERY 6 HOURS
Refills: 0 | Status: DISCONTINUED | OUTPATIENT
Start: 2024-03-31 | End: 2024-04-02

## 2024-03-31 RX ORDER — DIPHENHYDRAMINE HCL 50 MG
25 CAPSULE ORAL EVERY 6 HOURS
Refills: 0 | Status: DISCONTINUED | OUTPATIENT
Start: 2024-03-31 | End: 2024-04-02

## 2024-03-31 RX ORDER — TETANUS TOXOID, REDUCED DIPHTHERIA TOXOID AND ACELLULAR PERTUSSIS VACCINE, ADSORBED 5; 2.5; 8; 8; 2.5 [IU]/.5ML; [IU]/.5ML; UG/.5ML; UG/.5ML; UG/.5ML
0.5 SUSPENSION INTRAMUSCULAR ONCE
Refills: 0 | Status: DISCONTINUED | OUTPATIENT
Start: 2024-03-31 | End: 2024-04-02

## 2024-03-31 RX ORDER — FENTANYL/BUPIVACAINE/NS/PF 2MCG/ML-.1
250 PLASTIC BAG, INJECTION (ML) INJECTION
Refills: 0 | Status: DISCONTINUED | OUTPATIENT
Start: 2024-03-31 | End: 2024-04-02

## 2024-03-31 RX ORDER — PRAMOXINE HYDROCHLORIDE 150 MG/15G
1 AEROSOL, FOAM RECTAL EVERY 4 HOURS
Refills: 0 | Status: DISCONTINUED | OUTPATIENT
Start: 2024-03-31 | End: 2024-04-02

## 2024-03-31 RX ORDER — INFLUENZA VIRUS VACCINE 15; 15; 15; 15 UG/.5ML; UG/.5ML; UG/.5ML; UG/.5ML
0.5 SUSPENSION INTRAMUSCULAR ONCE
Refills: 0 | Status: DISCONTINUED | OUTPATIENT
Start: 2024-03-31 | End: 2024-04-02

## 2024-03-31 RX ORDER — FLUTICASONE FUROATE, UMECLIDINIUM BROMIDE AND VILANTEROL TRIFENATATE 200; 62.5; 25 UG/1; UG/1; UG/1
1 POWDER RESPIRATORY (INHALATION) DAILY
Refills: 0 | Status: DISCONTINUED | OUTPATIENT
Start: 2024-03-31 | End: 2024-03-31

## 2024-03-31 RX ORDER — LANOLIN
1 OINTMENT (GRAM) TOPICAL EVERY 6 HOURS
Refills: 0 | Status: DISCONTINUED | OUTPATIENT
Start: 2024-03-31 | End: 2024-04-02

## 2024-03-31 RX ORDER — IBUPROFEN 200 MG
600 TABLET ORAL EVERY 6 HOURS
Refills: 0 | Status: COMPLETED | OUTPATIENT
Start: 2024-03-31 | End: 2025-02-27

## 2024-03-31 RX ORDER — OXYCODONE HYDROCHLORIDE 5 MG/1
5 TABLET ORAL
Refills: 0 | Status: DISCONTINUED | OUTPATIENT
Start: 2024-03-31 | End: 2024-04-02

## 2024-03-31 RX ADMIN — SODIUM CHLORIDE 125 MILLILITER(S): 9 INJECTION, SOLUTION INTRAVENOUS at 14:10

## 2024-03-31 RX ADMIN — Medication 125 MILLIUNIT(S)/MIN: at 18:29

## 2024-03-31 RX ADMIN — Medication 2 MILLIUNIT(S)/MIN: at 15:03

## 2024-03-31 RX ADMIN — Medication 30 MILLIGRAM(S): at 19:23

## 2024-03-31 RX ADMIN — SODIUM CHLORIDE 3 MILLILITER(S): 9 INJECTION INTRAMUSCULAR; INTRAVENOUS; SUBCUTANEOUS at 22:00

## 2024-03-31 NOTE — OB RN DELIVERY SUMMARY - NSSELHIDDEN_OBGYN_ALL_OB_FT
[NS_DeliveryAttending1_OBGYN_ALL_OB_FT:[id number]==],[NS_DeliveryRN_OBGYN_ALL_OB_FT:[id number]]

## 2024-03-31 NOTE — OB RN DELIVERY SUMMARY - NS_DELIVERYROOM_OBGYN_ALL_OB_FT
CC: syncope (24 Aug 2020 08:04)    HPI: 83yo/M with PMH Parkinson's, HTN, BPH presented after syncopal episode. Patient is currently confused and not able to recall the history completely. Per chart, he stood up and synopsized.  No fever. NO head trauma reported. Patient denies having palpitations or chest pain. He admits he is not drinking enough fluids as does not want to void frequently. (23 Aug 2020 15:05)    INTERVAL HPI/ OVERNIGHT EVENTS:    Vital Signs Last 24 Hrs  T(C): 36.8 (24 Aug 2020 08:55), Max: 36.9 (23 Aug 2020 12:29)  T(F): 98.2 (24 Aug 2020 08:55), Max: 98.5 (23 Aug 2020 12:29)  HR: 59 (24 Aug 2020 08:55) (57 - 115)  BP: 158/76 (24 Aug 2020 08:55) (127/67 - 169/83)  BP(mean): 98 (23 Aug 2020 16:01) (98 - 98)  RR: 18 (24 Aug 2020 08:55) (13 - 19)  SpO2: 100% (24 Aug 2020 08:55) (98% - 100%)  I&O's Detail    23 Aug 2020 07:01  -  24 Aug 2020 07:00  --------------------------------------------------------  IN:    lactated ringers.: 700 mL    Oral Fluid: 120 mL  Total IN: 820 mL    OUT:    Voided: 225 mL  Total OUT: 225 mL    Total NET: 595 mL        REVIEW OF SYSTEMS:    CONSTITUTIONAL: No weakness, fevers or chills  EYES/ENT: No visual changes;  No vertigo or throat pain   NECK: No pain or stiffness  RESPIRATORY: No cough, wheezing, hemoptysis; No shortness of breath  CARDIOVASCULAR: No chest pain or palpitations  GASTROINTESTINAL: No abdominal or epigastric pain. No nausea, vomiting, or hematemesis; No diarrhea or constipation. No melena or hematochezia.  GENITOURINARY: No dysuria, frequency or hematuria  NEUROLOGICAL: No numbness or weakness  SKIN: No itching, burning, rashes, or lesions   All other review of systems is negative unless indicated above.  PHYSICAL EXAM:    General: Well developed; well nourished; in no acute distress  Eyes: PERRLA, EOMI; conjunctiva and sclera clear  Head: Normocephalic; atraumatic  ENMT: No nasal discharge; airway clear  Neck: Supple; non tender; no masses  Respiratory: No wheezes, rales or rhonchi  Cardiovascular: Regular rate and rhythm. S1 and S2 Normal; No murmurs, gallops or rubs  Gastrointestinal: Soft non-tender non-distended; Normal bowel sounds  Genitourinary: No  suprapubic  tenderness  Extremities: Normal range of motion, No clubbing, cyanosis or edema  Vascular: Peripheral pulses palpable 2+ bilaterally  Neurological: Alert and oriented x4  Skin: Warm and dry. No acute rash  Lymph Nodes: No acute cervical adenopathy  Musculoskeletal: Normal muscle tone, without deformities  Psychiatric: Cooperative and appropriate  CARDIAC MARKERS ( 23 Aug 2020 12:50 )  <0.015 ng/mL / x     / x     / x     / x                                11.6   5.13  )-----------( 174      ( 24 Aug 2020 08:38 )             35.4     24 Aug 2020 08:38    141    |  111    |  21     ----------------------------<  93     3.9     |  26     |  0.70     Ca    7.9        24 Aug 2020 08:38    TPro  6.7    /  Alb  3.4    /  TBili  0.6    /  DBili  x      /  AST  32     /  ALT  9      /  AlkPhos  111    23 Aug 2020 12:50    PT/INR - ( 23 Aug 2020 12:50 )   PT: 12.0 sec;   INR: 1.04 ratio         PTT - ( 23 Aug 2020 12:50 )  PTT:25.3 sec  CAPILLARY BLOOD GLUCOSE      POCT Blood Glucose.: 101 mg/dL (23 Aug 2020 12:29)    LIVER FUNCTIONS - ( 23 Aug 2020 12:50 )  Alb: 3.4 g/dL / Pro: 6.7 gm/dL / ALK PHOS: 111 U/L / ALT: 9 U/L / AST: 32 U/L / GGT: x           Urinalysis Basic - ( 23 Aug 2020 12:50 )    Color: Yellow / Appearance: Clear / S.010 / pH: x  Gluc: x / Ketone: Negative  / Bili: Negative / Urobili: Negative mg/dL   Blood: x / Protein: Negative mg/dL / Nitrite: Negative   Leuk Esterase: Trace / RBC: 0-2 /HPF / WBC 3-5   Sq Epi: x / Non Sq Epi: Moderate / Bacteria: Few        MEDICATIONS  (STANDING):  aspirin enteric coated 81 milliGRAM(s) Oral daily  atorvastatin 40 milliGRAM(s) Oral at bedtime  carbidopa/levodopa  25/100 1 Tablet(s) Oral three times a day  heparin   Injectable 5000 Unit(s) SubCutaneous every 12 hours  lactated ringers. 1000 milliLiter(s) (100 mL/Hr) IV Continuous <Continuous>  lisinopril 5 milliGRAM(s) Oral daily  metoprolol succinate ER 12.5 milliGRAM(s) Oral at bedtime  multivitamin 1 Tablet(s) Oral daily  pantoprazole    Tablet 40 milliGRAM(s) Oral before breakfast    MEDICATIONS  (PRN):  acetaminophen   Tablet .. 650 milliGRAM(s) Oral every 6 hours PRN Mild Pain (1 - 3)  aluminum hydroxide/magnesium hydroxide/simethicone Suspension 30 milliLiter(s) Oral every 4 hours PRN Dyspepsia  ondansetron Injectable 4 milliGRAM(s) IV Push every 6 hours PRN Nausea and/or Vomiting      RADIOLOGY & ADDITIONAL TESTS: CC: syncope (24 Aug 2020 08:04)    HPI:  83yo/M with PMH Parkinson's, HTN, BPH, Prostate CA  presented after syncopal episode Pt and wife report that  Pt was watching TV and was falling asleep couple of times, he woke up and then wife noted he passed out and was not responding, his eyes were closed and he was making yawning movements. Family called 911. Pt reports woke up in the ambulance. A sper ED records was confused.  Pt also reports that had few falls past 2 weeks, once aty night, and another time was getting up from sitting.  Felt lightheaded but denies Palpitations.     In ED:  Tachycardic, HR at 115, rest of vitals stable.  Labs  significant for azotemia. S/p IVF      INTERVAL HPI/ OVERNIGHT EVENTS: Pt was seen and examined at bedside after stress test, tolerated well, wife at bedside, results and further plan discussed. Pt reports that feels much better today       Vital Signs Last 24 Hrs  T(C): 36.8 (24 Aug 2020 08:55), Max: 36.9 (23 Aug 2020 12:29)  T(F): 98.2 (24 Aug 2020 08:55), Max: 98.5 (23 Aug 2020 12:29)  HR: 59 (24 Aug 2020 08:55) (57 - 115)  BP: 158/76 (24 Aug 2020 08:55) (127/67 - 169/83)  BP(mean): 98 (23 Aug 2020 16:01) (98 - 98)  RR: 18 (24 Aug 2020 08:55) (13 - 19)  SpO2: 100% (24 Aug 2020 08:55) (98% - 100%)      REVIEW OF SYSTEMS:  All other review of systems is negative unless indicated above.      PHYSICAL EXAM:  General: Well developed; malnourished; in no acute distress  Eyes: PERRLA, EOMI; conjunctiva and sclera clear  Head: Normocephalic; L orbital ecchymosis   ENMT: No nasal discharge; airway clear  Neck: Supple; non tender; no masses  Respiratory: No wheezes, rales or rhonchi  Cardiovascular: Regular rate and rhythm. S1 and S2 Normal;   Gastrointestinal: Soft non-tender non-distended; Normal bowel sounds  Genitourinary: No  suprapubic  tenderness  Extremities: Non pitting  edema  Vascular: Peripheral pulses palpable   Neurological: Alert and oriented x3, non focal,  forgetful, some bradykinesia   Skin: Warm and dry. No acute rash  Musculoskeletal: Normal muscle tone, without deformities  Psychiatric: Cooperative and appropriate      LABS:   CARDIAC MARKERS ( 23 Aug 2020 12:50 )  <0.015 ng/mL / x     / x     / x     / x                                11.6   5.13  )-----------( 174      ( 24 Aug 2020 08:38 )             35.4     24 Aug 2020 08:38    141    |  111    |  21     ----------------------------<  93     3.9     |  26     |  0.70     Ca    7.9        24 Aug 2020 08:38    TPro  6.7    /  Alb  3.4    /  TBili  0.6    /  DBili  x      /  AST  32     /  ALT  9      /  AlkPhos  111    23 Aug 2020 12:50  PT/INR - ( 23 Aug 2020 12:50 )   PT: 12.0 sec;   INR: 1.04 ratio    PTT - ( 23 Aug 2020 12:50 )  PTT:25.3 sec    POCT Blood Glucose.: 101 mg/dL (23 Aug 2020 12:29)    LIVER FUNCTIONS - ( 23 Aug 2020 12:50 )  Alb: 3.4 g/dL / Pro: 6.7 gm/dL / ALK PHOS: 111 U/L / ALT: 9 U/L / AST: 32 U/L / GGT: x           Urinalysis Basic - ( 23 Aug 2020 12:50 )  Color: Yellow / Appearance: Clear / S.010 / pH: x  Gluc: x / Ketone: Negative  / Bili: Negative / Urobili: Negative mg/dL   Blood: x / Protein: Negative mg/dL / Nitrite: Negative   Leuk Esterase: Trace / RBC: 0-2 /HPF / WBC 3-5   Sq Epi: x / Non Sq Epi: Moderate / Bacteria: Few    Culture - Blood (20 @ 12:51)    Specimen Source: .Blood Blood-Peripheral    Culture Results:   No growth to date.    Culture - Blood (20 @ 12:51)    Specimen Source: .Blood Blood-Peripheral    Culture Results:   No growth to date.        MEDICATIONS  (STANDING):  aspirin enteric coated 81 milliGRAM(s) Oral daily  atorvastatin 40 milliGRAM(s) Oral at bedtime  carbidopa/levodopa  25/100 1 Tablet(s) Oral three times a day  heparin   Injectable 5000 Unit(s) SubCutaneous every 12 hours  lactated ringers. 1000 milliLiter(s) (100 mL/Hr) IV Continuous <Continuous>  lisinopril 5 milliGRAM(s) Oral daily  metoprolol succinate ER 12.5 milliGRAM(s) Oral at bedtime  multivitamin 1 Tablet(s) Oral daily  pantoprazole    Tablet 40 milliGRAM(s) Oral before breakfast    MEDICATIONS  (PRN):  acetaminophen   Tablet .. 650 milliGRAM(s) Oral every 6 hours PRN Mild Pain (1 - 3)  aluminum hydroxide/magnesium hydroxide/simethicone Suspension 30 milliLiter(s) Oral every 4 hours PRN Dyspepsia  ondansetron Injectable 4 milliGRAM(s) IV Push every 6 hours PRN Nausea and/or Vomiting      RADIOLOGY & ADDITIONAL TESTS:    EXAM:  NM NUCLEAR STRESS MULTI PHARM                       PROCEDURE DATE:  2020      FINDINGS: The technical quality of the resting and post-stress perfusion images was satisfactory.  Review of resting and post-stress myocardial scintigrams revealed normal left ventricular perfusion. There was no evidence of reversible or fixed perfusion defects.    Gated wall motion study revealed normalleft ventricular contractility. There were no regional wall motion abnormalities or regions of decreased wall thickening. There was no paradoxical septal motion.    Calculated left ventricular ejection fraction post-stress was 84%, based on a left ventricular end diastolic volume of 69 mL and an end systolic volume of 11 mL. Stroke volume was 58 mL.    IMPRESSION: Normal SPECT Myocardial Perfusion Imaging.  No scan  evidence of reversible or fixed perfusion defects.  Normal left ventricular contractility with an ejection fraction of 84% (Normal: 50% or greater).  No regional wall motion abnormalities.  Please refer to cardiac stress test report for dosage of Regadenoson administered, EKG findings and symptoms during the procedur          EXAM:  CT MAXILLOFACIAL                        EXAM:  CT 3D RECONSTRUCT WO [facility]                        EXAM:  CT BRAIN                        EXAM:  CT CERVICAL SPINE                          PROCEDURE DATE:  2020       IMPRESSION:  No evidence of skull fracture acute infarct, intracranial hemorrhage or mass effect.  There is no orbital, nasal, maxillary or mandibular fracture.  There is no cervical spine fracture. [id number]

## 2024-03-31 NOTE — OB RN DELIVERY SUMMARY - BABY A: APGAR 5 MIN HEART RATE, DELIVERY
Detail Level: Zone Quality 130: Documentation Of Current Medications In The Medical Record: Documentation of a medical reason(s) for not documenting, updating, or reviewing the patientâs current medications list (e.g., patient is in an urgent or emergent medical situation) Additional Notes: Patient not taking any medication. (2) more than 100 beats/min

## 2024-03-31 NOTE — OB PROVIDER DELIVERY SUMMARY - NSSELHIDDEN_OBGYN_ALL_OB_FT
[NS_DeliveryAttending1_OBGYN_ALL_OB_FT:[id number]==],[NS_DeliveryRN_OBGYN_ALL_OB_FT:[id number]]

## 2024-03-31 NOTE — OB PROVIDER IHI INDUCTION/AUGMENTATION NOTE - NS_OBIHIADDITIONDETAILS_OBGYN_ALL_OB_FT
Came to bedside to place cook balloon. Uterine balloon filled with 80cc and catheter pulled to tension and taped to L thigh.   Patient tolerated procedure well. Tracing is CAT I, and she is sendy q 4min.   Plan to start pitocin if necessary. Dr. Gonzalez in house.

## 2024-03-31 NOTE — OB RN PATIENT PROFILE - FALL HARM RISK - UNIVERSAL INTERVENTIONS
Bed in lowest position, wheels locked, appropriate side rails in place/Call bell, personal items and telephone in reach/Instruct patient to call for assistance before getting out of bed or chair/Non-slip footwear when patient is out of bed/[place] to call system/Physically safe environment - no spills, clutter or unnecessary equipment/Purposeful Proactive Rounding/Room/bathroom lighting operational, light cord in reach

## 2024-03-31 NOTE — OB RN DELIVERY SUMMARY - NS_SEPSISRSKCALC_OBGYN_ALL_OB_FT
EOS calculated successfully. EOS Risk Factor: 0.5/1000 live births ([facility] national incidence); GA=37w6d; Temp=98.1; ROM=9.367; GBS='Negative'; Antibiotics='No antibiotics or any antibiotics < 2 hrs prior to birth'

## 2024-03-31 NOTE — OB PROVIDER H&P - NSLOWPPHRISK_OBGYN_A_OB
No previous uterine incision/[place] Pregnancy/Less than or equal to 4 previous vaginal births/No known bleeding disorder/No history of postpartum hemorrhage/No other PPH risks indicated

## 2024-03-31 NOTE — OB PROVIDER DELIVERY SUMMARY - NSOBVTERISKREFER_OBGYN_ALL_OB
[address] called in stating that did receive the PA for the [facility], but their computers lost the [employer]" of the script. Asking for the script to be e-scribed back over to them. [place] on [address] [place].    Ph # [phone] Refer to the Assessment tab to view/cancel completed assessment.

## 2024-03-31 NOTE — OB RN PATIENT PROFILE - NSICDXPASTMEDICALHX_GEN_ALL_CORE_FT
PAST MEDICAL HISTORY:  Asthma      (normal spontaneous vaginal delivery)     SAB (spontaneous )

## 2024-03-31 NOTE — OB RN DELIVERY SUMMARY - NSPOSTDELSIGNOUT_OBGYN_ALL_OB
Problem: Potential for Falls  Goal: Patient will remain free of falls  Description: INTERVENTIONS:  - Educate patient/family on patient safety including physical limitations  - Instruct patient to call for assistance with activity   - Consult OT/PT to assist with strengthening/mobility   - Keep Call bell within reach  - Keep bed low and locked with side rails adjusted as appropriate  - Keep care items and personal belongings within reach  - Initiate and maintain comfort rounds  - Make Fall Risk Sign visible to staff  - Offer Toileting every 2 Hours, in advance of need  - Initiate/Maintain bed alarm  - Obtain necessary fall risk management equipment:   - Apply yellow socks and bracelet for high fall risk patients  - Consider moving patient to room near nurses station  Outcome: Progressing     Problem: PAIN - ADULT  Goal: Verbalizes/displays adequate comfort level or baseline comfort level  Description: Interventions:  - Encourage patient to monitor pain and request assistance  - Assess pain using appropriate pain scale  - Administer analgesics based on type and severity of pain and evaluate response  - Implement non-pharmacological measures as appropriate and evaluate response  - Consider cultural and social influences on pain and pain management  - Notify physician/advanced practitioner if interventions unsuccessful or patient reports new pain  Outcome: Progressing     Problem: INFECTION - ADULT  Goal: Absence or prevention of progression during hospitalization  Description: INTERVENTIONS:  - Assess and monitor for signs and symptoms of infection  - Monitor lab/diagnostic results  - Monitor all insertion sites, i e  indwelling lines, tubes, and drains  - Monitor endotracheal if appropriate and nasal secretions for changes in amount and color  - Mead appropriate cooling/warming therapies per order  - Administer medications as ordered  - Instruct and encourage patient and family to use good hand hygiene technique  - Identify and instruct in appropriate isolation precautions for identified infection/condition  Outcome: Progressing     Problem: SAFETY ADULT  Goal: Patient will remain free of falls  Description: INTERVENTIONS:  - Educate patient/family on patient safety including physical limitations  - Instruct patient to call for assistance with activity   - Consult OT/PT to assist with strengthening/mobility   - Keep Call bell within reach  - Keep bed low and locked with side rails adjusted as appropriate  - Keep care items and personal belongings within reach  - Initiate and maintain comfort rounds  - Make Fall Risk Sign visible to staff  - Offer Toileting every 2 Hours, in advance of need  - Initiate/Maintain bed alarm  - Obtain necessary fall risk management equipment:   - Apply yellow socks and bracelet for high fall risk patients  - Consider moving patient to room near nurses station  Outcome: Progressing  Goal: Maintain or return to baseline ADL function  Description: INTERVENTIONS:  -  Assess patient's ability to carry out ADLs; assess patient's baseline for ADL function and identify physical deficits which impact ability to perform ADLs (bathing, care of mouth/teeth, toileting, grooming, dressing, etc )  - Assess/evaluate cause of self-care deficits   - Assess range of motion  - Assess patient's mobility; develop plan if impaired  - Assess patient's need for assistive devices and provide as appropriate  - Encourage maximum independence but intervene and supervise when necessary  - Involve family in performance of ADLs  - Assess for home care needs following discharge   - Consider OT consult to assist with ADL evaluation and planning for discharge  - Provide patient education as appropriate  Outcome: Progressing  Goal: Maintains/Returns to pre admission functional level  Description: INTERVENTIONS:  - Perform BMAT or MOVE assessment daily    - Set and communicate daily mobility goal to care team and patient/family/caregiver  - Collaborate with rehabilitation services on mobility goals if consulted  - Perform Range of Motion 3 times a day  - Reposition patient every 2 hours  - Dangle patient 3 times a day  - Stand patient 3 times a day  - Ambulate patient 3 times a day  - Out of bed to chair 3 times a day   - Out of bed for meals 3 times a day  - Out of bed for toileting  - Record patient progress and toleration of activity level   Outcome: Progressing     Problem: DISCHARGE PLANNING  Goal: Discharge to home or other facility with appropriate resources  Description: INTERVENTIONS:  - Identify barriers to discharge w/patient and caregiver  - Arrange for needed discharge resources and transportation as appropriate  - Identify discharge learning needs (meds, wound care, etc )  - Arrange for interpretive services to assist at discharge as needed  - Refer to Case Management Department for coordinating discharge planning if the patient needs post-hospital services based on physician/advanced practitioner order or complex needs related to functional status, cognitive ability, or social support system  Outcome: Progressing     Problem: Knowledge Deficit  Goal: Patient/family/caregiver demonstrates understanding of disease process, treatment plan, medications, and discharge instructions  Description: Complete learning assessment and assess knowledge base    Interventions:  - Provide teaching at level of understanding  - Provide teaching via preferred learning methods  Outcome: Progressing     Problem: MOBILITY - ADULT  Goal: Maintain or return to baseline ADL function  Description: INTERVENTIONS:  -  Assess patient's ability to carry out ADLs; assess patient's baseline for ADL function and identify physical deficits which impact ability to perform ADLs (bathing, care of mouth/teeth, toileting, grooming, dressing, etc )  - Assess/evaluate cause of self-care deficits   - Assess range of motion  - Assess patient's mobility; develop plan if impaired  - Assess patient's need for assistive devices and provide as appropriate  - Encourage maximum independence but intervene and supervise when necessary  - Involve family in performance of ADLs  - Assess for home care needs following discharge   - Consider OT consult to assist with ADL evaluation and planning for discharge  - Provide patient education as appropriate  Outcome: Progressing  Goal: Maintains/Returns to pre admission functional level  Description: INTERVENTIONS:  - Perform BMAT or MOVE assessment daily    - Set and communicate daily mobility goal to care team and patient/family/caregiver  - Collaborate with rehabilitation services on mobility goals if consulted  - Perform Range of Motion 3 times a day  - Reposition patient every 2 hours    - Dangle patient 3 times a day  - Stand patient 3 times a day  - Ambulate patient 3 times a day  - Out of bed to chair 3 times a day   - Out of bed for meals 3 times a day  - Out of bed for toileting  - Record patient progress and toleration of activity level   Outcome: Progressing Yes

## 2024-03-31 NOTE — OB RN PATIENT PROFILE - NSSDOHLAW_OBGYN_A_OB
[facility]  [facility]  [address] [address]  Dept: [phone]    Ambulatory Orthopedic Consult      CHIEF COMPLAINT:    Chief Complaint   Patient presents with    Foot Pain     bilateral        HISTORY OF PRESENT ILLNESS:      The patient is a 26 y.o. female who is being seen for evaluation of pain at the above location at the bilateral lateral hindfoot/ankle to the lateral midfoot, which began many years ago. The patient reports a progressive course. The patient has tried:      [x]  rest/activity modification          [x]  NSAIDs      []  opiates      [x]  orthotics        [x]  change in shoes   []  home exercises  [x]  physical therapy      []  CAM boot     [x]  brace:    []  injection:       [x]  surgery:      The patient is referred here today by Dr. Clif Wells. She reports that she had surgery in February 2016 with Dr. Tammy Braden for her left foot \"tendons and ligaments\". She also reports a history of neuropathy and \"nerve damage\" in her left foot. INTERVAL HISTORY 5/10/2021:  She is seen again today in the office for follow up of a previous issue (as above). Since being seen last, the patient is doing worse. At today's visit, she is not using a brace or assistive device. History is obtained today from:   [x]  the patient     []  EMR     []  one family member/friend    []  multiple family members/friends    []  other:      INTERVAL HISTORY 6/8/2021:  She is seen again today in the office for follow up of imaging as below. Since being seen last, the patient is doing about the same overall. At today's visit, she is using no brace/assistive device. History is obtained today from:   [x]  the patient     [x]  EMR     [x]  one family member/friend  --[]  multiple family members/friends    []  other:          REVIEW OF SYSTEMS:  Constitutional: Negative for fever. HENT: Negative for tinnitus.     Eyes: Negative for pain.   Respiratory: Negative for shortness of breath. Cardiovascular: Negative for chest pain. Gastrointestinal: Negative for abdominal pain. Genitourinary: Negative for dysuria. Skin: Negative for rash. Neurological: Negative for headaches. Hematological: Does not bruise/bleed easily. Musculoskeletal: See HPI for pertinent positives     Past Medical History:    She  has a past medical history of Hyperlipemia, Hypertension, Hypothyroid, and Type 2 diabetes mellitus ([address] Past Surgical History:    She  has a past surgical history that includes Ankle fracture surgery and Ankle arthroscopy (Left, 02/2016).      Current Medications:     Current Outpatient Medications:     TRULICITY 1.5 GL/2.0JQ SOPN, , Disp: , Rfl:     furosemide (LASIX) 40 MG tablet, , Disp: , Rfl:     pregabalin (LYRICA) 50 MG capsule, , Disp: , Rfl:     ibuprofen (ADVIL;MOTRIN) 200 MG CAPS, Take 2 tablets by mouth as needed, Disp: , Rfl:     diclofenac (VOLTAREN) 50 MG EC tablet, Take 1 tablet by mouth 3 times daily (with meals), Disp: 60 tablet, Rfl: 1    Ankle Foot Arthosis MISC, by Does not apply route Use lateral flare to correct varus L side Peroneal tendinitis of left lower extremity  (primary encounter diagnosis)  Left foot pain  Acquired heel varus of left foot, Disp: 1 each, Rfl: 0    glimepiride (AMARYL) 4 MG tablet, Take 4 mg by mouth, Disp: , Rfl:     metFORMIN (GLUCOPHAGE-XR) 750 MG extended release tablet, Take 750 mg by mouth, Disp: , Rfl:     LEVEMIR FLEXTOUCH 100 UNIT/ML injection pen, , Disp: , Rfl: 2    metoprolol succinate (TOPROL XL) 100 MG extended release tablet, Take 100 mg by mouth, Disp: , Rfl:     atorvastatin (LIPITOR) 20 MG tablet, , Disp: , Rfl: 3    famotidine (PEPCID) 40 MG tablet, , Disp: , Rfl: 2    sertraline (ZOLOFT) 50 MG tablet, , Disp: , Rfl: 3    ferrous sulfate 325 (65 Fe) MG tablet, , Disp: , Rfl: 3    levothyroxine (SYNTHROID) 50 MCG tablet, , Disp: , Rfl: 3    losartan (COZAAR) 25 MG tablet, , Disp: , Rfl: 3     Allergies:    Erythromycin base and Gabapentin    Family History:  family history includes Cancer in her father and mother; Diabetes in her father and mother; Heart Defect in her mother; Hypertension in her mother; Hyperthyroidism in her mother. Social History:   Social History     Occupational History    Not on file   Tobacco Use    Smoking status: Current Every Day Smoker     Packs/day: 0.50     Types: Cigarettes    Smokeless tobacco: Never Used   Vaping Use    Vaping Use: Never used   Substance and Sexual Activity    Alcohol use: Never    Drug use: Never    Sexual activity: Not on file     Occupation: Previously worked as a  full-time at [employer], now working for home health     OBJECTIVE:  Temp 98.4 °F (36.9 °C)   Resp 12   Ht 5' 8\" (1.727 m)   Wt (!) 303 lb (137.4 kg)   BMI 46.07 kg/m²    Psych: alert and oriented to person, time, and place  Cardio:  well perfused extremities  Resp:  normal respiratory effort  Skin:  no cyanosis  Hem/lymph:  no lymphedema  Neuro:  sensation to light touch grossly intact throughout all nerve distributions in the foot   Musculoskeletal:    RLE:  Vascular: Toes warm and well perfused, compartments soft/compressible, no significant swelling of foot. Skin: Intact without rash/lesions/AV malformations. Strength: Able to fire/perform the following with appropriate strength:    [x]  Tib Ant:     [x]  Gastroc-Soleus:         [x]  Inversion:      []  Eversion:  weak, painful       [x]  FHL:     [x]  EHL:      Motion:  Normal for the following joints:    [x]  Ankle:      [x]  Subtalar:       [x]  1st MTP:        []  1st TMT:            Tenderness to Palpation:    Tenderness to palpation:  along course of peroneal tendons  -Equinus      LLE:  Vascular: Toes warm and well perfused, compartments soft/compressible, no significant swelling of foot. Skin: Intact without rash/lesions/AV malformations.   Strength: Able to fire/perform the following with appropriate strength:    [x]  Tib Ant:     [x]  Gastroc-Soleus:         [x]  Inversion:      []  Eversion:  weak, painful       [x]  FHL:     [x]  EHL:      Motion:  Normal for the following joints:    [x]  Ankle:      [x]  Subtalar:       [x]  1st MTP:        []  1st TMT:            Tenderness to Palpation:    Tenderness to palpation:  along course of peroneal tendons  -Equinus    Instability:   -Talar tilt: Negative  -Anterior drawer: Negative  -No peroneal subluxation/dislocation with dorsiflexion + eversion or with circumduction      RADIOLOGY:   6/8/2021 Prior images reviewed for reference. MRI images and radiology report reviewed, as below:    1. Mild-to-moderate tendinosis and partial split tearing of peroneus brevis. 2. Mild diffuse degenerative changes of the midfoot and TMT joints. 3. Mild edema in the subcutaneous fat about the ankle and dorsum of the foot. 4. Os trigonum. 5. Evidence of remote partial tearing of the medial deltoid ligament complex   and ATFL.         1. Mild degenerative changes as detailed above. 2. Mild diffuse edema throughout the intertarsal musculature and subcutaneous   fat of the left foot. 3. No acute osseous abnormality. 4. Please see MR left ankle without contrast report from 05/25/2021 for   additional details.                FINDINGS:  Three weightbearing views (AP, Mortise, and Lateral) of the bilateral ankle and three weightbearing views (AP, Oblique, Lateral) of the bilateral foot were obtained in the office today and reviewed, revealing no acute fracture, dislocation, or radioopaque foreign body/tumor. The ankle mortise is maintained with no widening of the clear spaces. Narrowed talocalcaneal angle worse on the left than on the right. IMPRESSION:  No acute fracture/dislocation. Electronically signed by Nadia Castaneda MD      ASSESSMENT AND PLAN:  Body mass index is 46.07 kg/m².        She has bilateral (left greater than right) peroneal tendinopathy, with underlying mild cavus radiographically and equinus. She also reports a history of left ankle instability (negative physical exam findings). Notably, she has a complex past medical history. She has a history of heart disease, insulin-dependent diabetes with neuropathy, history of CRPS type II (she is unable to clarify details surrounding this diagnosis), and tobacco use (reports she smokes 1/2 pack cigarettes per day). We had a discussion today about the likely diagnosis and its natural history, physical exam and imaging findings, as well as various treatment options in detail. Surgically, we discussed a left peroneal tendon repair/debridement/tenolysis and/or tendon transfer. We again discussed the expected postoperative course, including the relevant weightbearing restrictions and immobilization. Due to the fact the patient is experiencing significant pain which has a significant impact on her quality of life, she does wish to proceed with surgery in the near future. She reports that she is currently only able to walk \"custodial through [facility]" (about 10 minutes) due to her pain. I do believe that surgery may offer her benefit, and we did discuss her medical problems in the context of surgery and her outcome. Given her past medical history as above, we discussed that the patient is at higher risk for complications and a compromise outcome. We did discuss the possibility of a CRPS flareup, and we discussed that this could make her pain worse, and she and her  expressed verbal understanding of this. We discussed nonoperative treatment options again as well today, however she is not interested in pursuing more nonoperative management, and does wish to proceed with surgery.     Given her history of CRPS type II, and her insulin-dependent diabetes with neuropathy as well as her tobacco use, she does have significant risk factors for postoperative document that actually reflects the content of the visit, the document can still have some errors including those of syntax and sound-a-like substitutions which may escape proof reading. In such instances, actual meaning can be extrapolated by context. I do not need any legal help

## 2024-03-31 NOTE — OB PROVIDER H&P - HISTORY OF PRESENT ILLNESS
Pt is a 33y  @ 37w6d presenting with ruptured membranes since 9am. Patient sat on toilet and after urinating felt a gush of fluid come out. She put on a pad for the rest of the day, and said she kept feeling fluid coming that was then blood tinged. Patient states contractions started at that time as well, but that she is comfortable at this time with minor pain during contractions. Endorses good fetal movement.     ANTE: Spontaneous. NIPT WNL. Anatomy WNL. Failed GCT.   No HTN or Thyroid issues in pregnancy. GBS negative. EFW: 3100grams.     Obhx:   G1 - SAB   G2 -  2019 - 3770g  G3 - current  Gynhx: Denies fibroids, cysts, abnormal pap smears, or STDs  Mhx: ASthma  Shx: B/l inguinal hernia repair @ age 4  Meds: PNV, Trelegy QAM for Asthma, Zyrtec  All: NKDA    Physical Exam:   T(C): 36.7 (24 @ 12:56), Max: 36.7 (24 @ 12:56)  HR: 89 (24 @ 12:56) (89 - 89)  BP: 124/77 (24 @ 12:56) (124/77 - 124/77)  RR: 16 (24 @ 12:56) (16 - 16)  SpO2: --    General: A&Ox3 - NAD   Pulmonary: No increased work of breathing  Abdomen: Soft; Nontender; Gravid  Extremities: No pedal edema or tenderness bilaterally   Skin: No rashes, masses, or lesions     SVE: 2/50/-3  SSE: + pooling, + nitrazine   FHT:150bpm baseline, moderate variability, + accels, no decels  TOCO: Contractions q 3min apart   TAUS: Cephalic, ant placenta; BERTHA 5.95, BPP 8/8    A/P: Pt is a 33y  @ 37w6d presenting with ruptured membranes in early labor  - Admit to L&D  - NPO/IVF  - Consents signed   - Prenatals reviewed; GBS negative  - Routine labs sent   - Continuous fetal and TOCO monitoring   - GDMA1: fingersticks Q6 hours, rotating fluids  - Asthma: Albuterol ordered, pt to take her trelegy QAM   - Plan for Augmentation of labor with cook balloon and pitocin     Steve Julian PA-C   Discussed w/ Dr. Gonzalez

## 2024-04-01 ENCOUNTER — TRANSCRIPTION ENCOUNTER (OUTPATIENT)
Age: 34
End: 2024-04-01

## 2024-04-01 LAB — T PALLIDUM AB TITR SER: NEGATIVE — SIGNIFICANT CHANGE UP

## 2024-04-01 RX ORDER — IBUPROFEN 200 MG
1 TABLET ORAL
Qty: 0 | Refills: 0 | DISCHARGE
Start: 2024-04-01

## 2024-04-01 RX ORDER — ACETAMINOPHEN 500 MG
3 TABLET ORAL
Qty: 0 | Refills: 0 | DISCHARGE
Start: 2024-04-01

## 2024-04-01 RX ADMIN — Medication 975 MILLIGRAM(S): at 08:57

## 2024-04-01 RX ADMIN — Medication 975 MILLIGRAM(S): at 03:42

## 2024-04-01 RX ADMIN — Medication 1 APPLICATION(S): at 12:17

## 2024-04-01 RX ADMIN — SODIUM CHLORIDE 3 MILLILITER(S): 9 INJECTION INTRAMUSCULAR; INTRAVENOUS; SUBCUTANEOUS at 22:00

## 2024-04-01 RX ADMIN — Medication 1 SPRAY(S): at 12:17

## 2024-04-01 RX ADMIN — Medication 600 MILLIGRAM(S): at 12:18

## 2024-04-01 RX ADMIN — Medication 1 TABLET(S): at 12:19

## 2024-04-01 RX ADMIN — Medication 975 MILLIGRAM(S): at 15:28

## 2024-04-01 RX ADMIN — Medication 600 MILLIGRAM(S): at 06:37

## 2024-04-01 RX ADMIN — Medication 600 MILLIGRAM(S): at 18:01

## 2024-04-01 RX ADMIN — Medication 975 MILLIGRAM(S): at 21:31

## 2024-04-01 NOTE — DISCHARGE NOTE OB - PATIENT PORTAL LINK FT
You can access the FollowMyHealth Patient Portal offered by [facility] by registering at the following website: [email or web address]. By joining [employer]’s FollowMyHealth portal, you will also be able to view your health information using other applications (apps) compatible with our system.

## 2024-04-01 NOTE — DISCHARGE NOTE OB - CARE PROVIDER_API CALL
Luz Gonzalez  Obstetrics and Gynecology  [address] [place] [address], [address]  [place] [address]  Phone: [phone]  Fax: [phone]  Follow Up Time: 2 weeks

## 2024-04-01 NOTE — DISCHARGE NOTE OB - MEDICATION SUMMARY - MEDICATIONS TO TAKE
I will START or STAY ON the medications listed below when I get home from the hospital:    ibuprofen 600 mg oral tablet  -- 1 tab(s) by mouth every 6 hours  -- Indication: For Pain    acetaminophen 325 mg oral tablet  -- 3 tab(s) by mouth every 6 hours  -- Indication: For Pain    ZyrTEC 10 mg oral tablet  -- 1 tab(s) orally  -- Indication: For Allergy    Trelegy Ellipta 200 mcg-62.5 mcg-25 mcg/inh inhalation powder  -- 1 puff(s) inhaled once a day  -- Indication: For Asthma

## 2024-04-01 NOTE — DISCHARGE NOTE OB - NS MD DC FALL RISK RISK
For information on Fall & Injury Prevention, visit: [email or web address] OR  [email or web address] OR  https://www.cdc.gov/steadi/patient.html

## 2024-04-01 NOTE — PROGRESS NOTE ADULT - ASSESSMENT
A/P 33y s/p , PPD1 , stable, meeting postpartum milestones   - Pain: well controlled on tylenol/motrin  - GI: Tolerating regular diet  - : urinating without difficulty/pain  - DVT prophylaxis: ambulating frequently  - Dispo: PPD 2, unless otherwise specified

## 2024-04-01 NOTE — PROGRESS NOTE ADULT - SUBJECTIVE AND OBJECTIVE BOX
Patient evaluated at bedside this morning, resting comfortable in bed, no acute events overnight.  She reports pain is well controlled with tylenol and motrin.  She denies nausea, vomiting, fever, chills, heavy vaginal bleeding. She has been ambulating without assistance, voiding spontaneously.  Tolerating food well, without nausea/vomit.      Physical Exam:  T(C): 36.9 (04-01-24 @ 02:10), Max: 37 (03-31-24 @ 22:00)  HR: 75 (04-01-24 @ 02:10) (61 - 98)  BP: 106/66 (04-01-24 @ 02:10) (93/59 - 126/75)  RR: 18 (04-01-24 @ 02:10) (16 - 20)  SpO2: 96% (04-01-24 @ 02:10) (96% - 100%)    GA: lying comfortably in bed, NAD  Pulm: no increased work of breathing  Abd: soft, nontender, nondistended, no rebound or guarding, uterus firm.  Extremities: no calf tenderness                          13.4   11.50 )-----------( 201      ( 31 Mar 2024 13:44 )             39.4           acetaminophen     Tablet .. 975 milliGRAM(s) Oral <User Schedule>  albuterol    90 MICROgram(s) HFA Inhaler 2 Puff(s) Inhalation every 6 hours PRN  benzocaine 20%/menthol 0.5% Spray 1 Spray(s) Topical every 6 hours PRN  budesonide 160 MICROgram(s)/formoterol 4.5 MICROgram(s) Inhaler 2 Puff(s) Inhalation two times a day  dibucaine 1% Ointment 1 Application(s) Topical every 6 hours PRN  diphenhydrAMINE 25 milliGRAM(s) Oral every 6 hours PRN  diphtheria/tetanus/pertussis (acellular) Vaccine (Adacel) 0.5 milliLiter(s) IntraMuscular once  fentanyl (2 MICROgram(s)/mL) + bupivacaine 0.0625%  in 0.9% Sodium Chloride PCEA 250 milliLiter(s) Epidural PCA Continuous  hydrocortisone 1% Cream 1 Application(s) Topical every 6 hours PRN  ibuprofen  Tablet. 600 milliGRAM(s) Oral every 6 hours  influenza   Vaccine 0.5 milliLiter(s) IntraMuscular once  lanolin Ointment 1 Application(s) Topical every 6 hours PRN  magnesium hydroxide Suspension 30 milliLiter(s) Oral two times a day PRN  oxyCODONE    IR 5 milliGRAM(s) Oral once PRN  oxyCODONE    IR 5 milliGRAM(s) Oral every 3 hours PRN  oxytocin Infusion 41.667 milliUNIT(s)/Min IV Continuous <Continuous>  oxytocin Infusion. 2 milliUNIT(s)/Min IV Continuous <Continuous>  pramoxine 1%/zinc 5% Cream 1 Application(s) Topical every 4 hours PRN  prenatal multivitamin 1 Tablet(s) Oral daily  simethicone 80 milliGRAM(s) Chew every 4 hours PRN  sodium chloride 0.9% lock flush 3 milliLiter(s) IV Push every 8 hours  tiotropium 2.5 MICROgram(s) Inhaler 2 Puff(s) Inhalation daily  witch hazel Pads 1 Application(s) Topical every 4 hours PRN

## 2024-04-01 NOTE — DISCHARGE NOTE OB - NS OB DC IMMUNIZATIONS MMR YN
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 She was started on Letairis 5mg in Dec after a [address] on 11/29/12. She stopped Letaris due to swelling and does not want to take another medication in it's place. She does suffer from Raynaud's and has cold fingers but no wounds. Sees Dr. Lul Cleary regularly. Her echo 12/2021 showed normal right ventricular size and function and EF 55%. Monitor 7/2022-8/2022 showed PAF w/ avr, 7% burden. She saw Dr. Delmi Mauricio 8/30/22 (initial visit) and was started on Eliquis. 9/14/22> Normal nuclear stress test.    She is fully vaccinated and boosted. Today, she is here for regular follow up and an ECHO. Overall, she feels fairly well. She denies exertional chest pain, POOLE/PND, light-headedness, edema. She can tell when she flips into atrial fib, states the medicine is helping. She does not like taking all the meds. Her  is with her for the visit. NYHA:  II    No Known Allergies    Current Outpatient Medications:     atorvastatin (LIPITOR) 10 MG tablet, TAKE 1 TABLET DAILY, Disp: 90 tablet, Rfl: 3    pantoprazole (PROTONIX) 40 MG tablet, Take 1 tablet by mouth in the morning and at bedtime, Disp: 60 tablet, Rfl: 5    metoprolol succinate (TOPROL XL) 25 MG extended release tablet, Take 1 tablet by mouth nightly, Disp: 90 tablet, Rfl: 3    amLODIPine (NORVASC) 5 MG tablet, TAKE 1 TABLET DAILY, Disp: 90 tablet, Rfl: 3    alendronate (FOSAMAX) 70 MG tablet, Take 1 tablet by mouth every 7 days, Disp: 12 tablet, Rfl: 3    apixaban (ELIQUIS) 5 MG TABS tablet, Take 1 tablet by mouth in the morning and 1 tablet before bedtime. , Disp: 180 tablet, Rfl: 0    valsartan-hydroCHLOROthiazide (DIOVAN-HCT) 160-25 MG per tablet, TAKE 1 TABLET DAILY, Disp: 90 tablet, Rfl: 3    aspirin 81 MG EC tablet, Take 1 tablet by mouth daily (Patient taking differently: Take 40 mg by mouth daily), Disp: 90 tablet, Rfl: 3    vitamin E 400 UNIT capsule, Take 400 Units by mouth daily, Disp: , Rfl:     Cholecalciferol (VITAMIN D3) 1000 UNITS CAPS, Take 1 capsule by mouth daily , Disp: 30 capsule, Rfl:     Garlic 7916 MG TABS, Take 1 tablet by mouth daily , Disp: , Rfl:     Coenzyme Q10 (CO Q-10) 100 MG CAPS, Take 1 capsule by mouth daily , Disp: , Rfl:     Cinnamon 500 MG TABS, Take 1 tablet by mouth daily , Disp: , Rfl:     calcium carbonate 600 MG TABS tablet, Take 1 tablet by mouth daily. , Disp: , Rfl:     Ginkgo Biloba 40 MG TABS, Take 40 mg by mouth daily. , Disp: , Rfl:     Multiple Vitamin (MULTIVITAMIN PO), Take 1 tablet by mouth daily , Disp: , Rfl:     Pyridoxine HCl (VITAMIN B-6 PO), Take 1 tablet by mouth daily , Disp: , Rfl:     fish oil-omega-3 fatty acids 1000 MG capsule, Take 1 g by mouth daily , Disp: , Rfl:     vitamin B-12 (CYANOCOBALAMIN) 100 MCG tablet, Take 100 mcg by mouth daily. , Disp: , Rfl:     Flaxseed, Linseed, (FLAX SEED OIL) 1000 MG CAPS, Take 1,000 mg by mouth daily. , Disp: , Rfl:     Ascorbic Acid (VITAMIN C) 500 MG tablet, Take 500 mg by mouth daily.   , Disp: , Rfl:     furosemide (LASIX) 20 MG tablet, Take 1 tablet by mouth daily as needed (swelling) (Patient not taking: Reported on 1/16/2023), Disp: 45 tablet, Rfl: 2      Immunization History   Administered Date(s) Administered    COVID-19, MODERNA BLUE border, Primary or Immunocompromised, (age 12y+), IM, 100 mcg/0.5mL 02/23/2021, 03/23/2021, 11/15/2021    COVID-19, MODERNA Booster BLUE border, (age 18y+), IM, 50mcg/0.25mL 11/15/2021    Influenza 11/30/2015    Influenza Virus Vaccine 10/15/2014    Influenza Whole 09/23/2009    Influenza, FLUAD, (age 72 y+), Adjuvanted, 0.5mL 11/15/2021    Influenza, FLUZONE (age 72 y+), High Dose, 0.7mL 10/29/2020    Influenza, High Dose (Fluzone 65 yrs and older) 11/30/2015, 12/12/2016, 10/30/2018, 11/05/2019    Pneumococcal Conjugate 13-valent ([id number]) 08/13/2015    Pneumococcal Polysaccharide (Pltfpmain56) 04/26/2013, 12/12/2016    Tdap (Boostrix, Adacel) 04/26/2013    Zoster Live (Zostavax) 06/11/2013    Zoster Recombinant (Shingrix) 02/17/2022     Patient Active Problem List   Diagnosis    Pure hypercholesterolemia    Raynauds syndrome    GERD (gastroesophageal reflux disease)    Hypertension    CREST syndrome ([address]    PAH (pulmonary artery hypertension) (HCC)    Vitamin D deficiency    Osteopenia    Sigmoid diverticulosis    Adenomatous polyp    PAF (paroxysmal atrial fibrillation) ([address]     Past Medical History:   Diagnosis Date    Adenomatous polyp 07/17/2015    negative for high grade dysplasiaManegold    CREST syndrome (HCC)     GERD (gastroesophageal reflux disease)     Hypertension     Osteopenia 07/22/2010    LS -1.15    PAH (pulmonary artery hypertension) ([facility]     Pure hypercholesterolemia     Raynauds syndrome     [place]    Sigmoid diverticulosis 07/17/2015    [place]    Vitamin D deficiency      Past Surgical History:   Procedure Laterality Date    BREAST BIOPSY  2008    right breast    CARDIAC CATHETERIZATION  11/27/12    Right Heart Cath    CATARACT REMOVAL Bilateral 06/2020    COLONOSCOPY N/A 6/25/2020    COLONOSCOPY POLYPECTOMY SNARE/COLD BIOPSY performed by Yuri Avalos MD at [address] Left 08/2019    UPPER GASTROINTESTINAL ENDOSCOPY N/A 12/28/2022    EGD BIOPSY performed by Dalton Pelaez MD at [address] [facility] END[facility]COPY     Family History   Problem Relation Age of Onset    Heart Disease Mother 80        coronary stents    Cancer Father 46        bladder cancer, he was a smoker    High Cholesterol Sister     No Known Problems Brother      Social History     Socioeconomic History    Marital status:      Spouse name: Deepak Barrientos    Number of children: 2 Years of education: 15    Highest education level: High school graduate   Occupational History    Not on file   Tobacco Use    Smoking status: Never    Smokeless tobacco: Never    Tobacco comments:     avoid tobacco   Vaping Use    Vaping Use: Never used   Substance and Sexual Activity    Alcohol use: Yes     Comment: Has an occasional drink on holidays. Drug use: No    Sexual activity: Yes     Partners: Male     Comment:    Other Topics Concern    Not on file   Social History Narrative    Not on file     Social Determinants of Health     Financial Resource Strain: Low Risk     Difficulty of Paying Living Expenses: Not hard at all   Food Insecurity: No Food Insecurity    Worried About Running Out of Food in the Last Year: Never true    [address] in the Last Year: Never true   Transportation Needs: No Transportation Needs    Lack of Transportation (Medical): No    Lack of Transportation (Non-Medical): No   Physical Activity: Inactive    Days of Exercise per Week: 0 days    Minutes of Exercise per Session: 0 min   Stress: Not on file   Social Connections: Not on file   Intimate Partner Violence: Not on file   Housing Stability: Not on file     Review of Systems:   Constitutional: there has been no unanticipated weight loss. There's been no change in energy level, sleep pattern, or activity level. Eyes: No visual changes or diplopia. No scleral icterus. ENT: No Headaches, hearing loss or vertigo. No mouth sores or sore throat. Cardiovascular: Reviewed in HPI  Respiratory: No cough or wheezing, no sputum production. No hematemesis. Gastrointestinal: No abdominal pain, appetite loss, blood in stools. No change in bowel or bladder habits. Genitourinary: No dysuria, trouble voiding, or hematuria. Musculoskeletal:  No gait disturbance, weakness or joint complaints. Integumentary: No rash or pruritis. Neurological: No headache, diplopia, change in muscle strength, numbness or tingling.  No change in gait, balance, coordination, mood, affect, memory, mentation, behavior. Psychiatric: No anxiety, no depression. Endocrine: No malaise, fatigue or temperature intolerance. No excessive thirst, fluid intake, or urination. No tremor. Hematologic/Lymphatic: No abnormal bruising or bleeding, blood clots or swollen lymph nodes. Allergic/Immunologic: No nasal congestion or hives. Physical Examination:    Vitals:    01/16/23 0951   BP: 122/60   Pulse: 67   SpO2: 98%   Weight: 187 lb 12.8 oz (85.2 kg)   Height: 5' 5\" (1.651 m)     Stable. Wt Readings from Last 3 Encounters:   01/16/23 187 lb 12.8 oz (85.2 kg)   12/28/22 185 lb (83.9 kg)   12/14/22 186 lb (84.4 kg)     BP Readings from Last 3 Encounters:   01/16/23 122/60   12/28/22 (!) 150/70   12/14/22 128/80       Constitutional and General Appearance: Warm and dry, no apparent distress, normal coloration. HEENT:  Normocephalic, atraumatic  Respiratory:  Normal excursion and expansion without use of accessory muscles  Resp Auscultation: Normal breath sounds without dullness  Cardiovascular: The apical impulses not displaced  Heart tones are crisp and normal  JVP ~ 8 cm H2O. Regular rate and rhythm, normal L5C1, 2/6 systolic murmur at USB, no g/r/c  Peripheral pulses are symmetrical and full  There is no clubbing, cyanosis of the extremities. No edema in ankles.   Pedal Pulses: 2+ and equal   Abdomen:  No masses or tenderness  Liver/Spleen: No Abnormalities Noted  Neurological/Psychiatric:  Alert and oriented in all spheres  Moves all extremities well  Exhibits normal gait balance and coordination  No abnormalities of mood, affect, memory, mentation, or behavior are noted    Lab Data:  CBC:   Lab Results   Component Value Date/Time    WBC 4.7 08/02/2022 12:48 PM    WBC 4.5 01/11/2022 10:21 AM    WBC 5.7 06/11/2021 11:50 AM    RBC 4.00 08/02/2022 12:48 PM    RBC 4.24 01/11/2022 10:21 AM    RBC 4.31 06/11/2021 11:50 AM    HGB 12.0 08/02/2022 12:48 PM    HGB 12.4 01/11/2022 10:21 AM    HGB 13.0 06/11/2021 11:50 AM    HCT 35.5 08/02/2022 12:48 PM    HCT 36.7 01/11/2022 10:21 AM    HCT 37.8 06/11/2021 11:50 AM    MCV 88.7 08/02/2022 12:48 PM    MCV 86.4 01/11/2022 10:21 AM    MCV 87.6 06/11/2021 11:50 AM    RDW 14.0 08/02/2022 12:48 PM    RDW 14.0 01/11/2022 10:21 AM    RDW 13.9 06/11/2021 11:50 AM     08/02/2022 12:48 PM     01/11/2022 10:21 AM     06/11/2021 11:50 AM     BMP:   Lab Results   Component Value Date/Time     08/02/2022 12:48 PM     01/11/2022 10:21 AM     06/11/2021 11:50 AM    K 3.9 08/02/2022 12:48 PM    K 4.0 01/11/2022 10:21 AM    K 4.5 06/11/2021 11:50 AM     08/02/2022 12:48 PM     01/11/2022 10:21 AM     06/11/2021 11:50 AM    CO2 27 08/02/2022 12:48 PM    CO2 26 01/11/2022 10:21 AM    CO2 27 06/11/2021 11:50 AM    PHOS 3.9 11/29/2012 10:40 AM    BUN 16 08/02/2022 12:48 PM    BUN 12 01/11/2022 10:21 AM    BUN 14 06/11/2021 11:50 AM    CREATININE 0.8 08/02/2022 12:48 PM    CREATININE 0.8 06/15/2022 11:20 AM    CREATININE 0.6 01/11/2022 10:21 AM     BNP:   Lab Results   Component Value Date/Time    BNP 40 03/03/2014 10:20 AM    BNP 28 11/29/2012 10:40 AM    BNP 52 08/10/2012 12:53 PM     FASTING LIPID PANEL:  Lab Results   Component Value Date/Time    CHOL 151 01/11/2022 10:21 AM    HDL 60 08/02/2022 12:48 PM    HDL 65 01/23/2012 10:38 AM    TRIG 101 01/11/2022 10:21 AM     Testing:     ECHO 1/16/23  Normal left ventricle size, wall thickness, and systolic function with an estimated ejection fraction of 55-60% (Abad's EF: 58%, Heart model EF: 57%). No regional wall motion abnormalities are seen. Global longitudinal strain: -18.7% (normal). Grade I diastolic dysfunction with normal LV filling pressures. The mitral valve leaflets are slightly thickened with normal leaflet mobility. Trivial mitral regurgitation. Trivial aortic regurgitation.    Mild tricuspid regurgitation with an RVSP of 30 mmHg. Trivial pulmonic regurgitation. GXT myoview 9/14/22  Normal myocardial perfusion study. Normal LV size and systolic function. Stress Protocols  Normal sinus rhythm. Normal ECG. Resting ECG  Resting HR: 75 bpm    Echo 12/6/21   Normal left ventricle size, wall thickness and systolic function with an estimated ejection fraction of 55%. No regional wall motion abnormalities are seen. E/e\"= 7.2 . Grade I diastolic dysfunction. Normal right ventricular size and function. TAPSE 2.5 cm. RV S velocity 11.1 cm/s. Mild to moderate tricuspid regurgitation. IVC size is normal (<2.1cm) and collapses > 50% with respiration consistent with normal RA pressure (3mmHg). 6 Minute [place] Walk  6/15/21  Pre Walk:  124/62,  HR 68,  Saturation 98%  Immediate walk:  158/78 HR 76, Saturation 100%  5 Minute post Walk: 138/80 HR 77, Saturation 100%    Walked 6 minutes, no rests. 402.6 meters walked. 474 meters on [place] Walk 6/20/2017    Echo 12/4/2020  -Overall left ventricular function is normal.   -Ejection fraction is visually estimated to be 55-60%. E/e'= 8.4   -No regional wall motion abnormalities are noted. -Grade I diastolic dysfunction with normal LV filling pressures. -Mild tricuspid regurgitation. Estimated pulmonary artery systolic pressure is mildly to moderately elevated but unable to measure exact PASP due to incomplete TR envelope. Echo  12/2/19  Summary   *Left ventricle - normal size, thickness and function with EF of 60%   *Mitral valve - mild regurgitation   *Tricuspid valve - mild regurgitation with PASP of 31mmHg    Echo 12/10/2018  Normal left ventricle size, wall thickness and systolic function with an  estimated ejection fraction of 60%. No regional wall motion abnormalities  are seen. E/e\"= 25.5  Normal diastolic function. Mild mitral regurgitation is present.   There is mild-moderate tricuspid regurgitation with RVSP estimated at 30  mmHg    Echo 12/21/2017    Normal left ventricle size, wall thickness and systolic function with an   estimated ejection fraction of 60%. No regional wall motion abnormalities   are seen. E/e\"= 10. Normal diastolic function. Mild mitral regurgitation. Mild-moderate tricuspid regurgitation with RVSP estimated at 34 mmHg. Trivial pulmonic regurgitation. Echo 12/2/2016  RVSP 34 mm Hg essentially unchanged from 8/2015. Echo 8/2015  Normal left ventricle size, wall thickness and systolic function with an estimated ejection fraction of 55%. -No regional wall motion abnormalities  are seen. Mild mitral regurgitation is present. There is mild-moderate tricuspid regurgitation with RVSP estimated at 35 mmHG    [facility] 11/29/12 - RA: 7  V: 43/8  PA: 42/14, mean 25  PCWP: 10  Thermodilution CO: 6.47 Thermodilution CI: 3.42   Jaki CO: 5.51  Jaki CI: 2.92  PA Sat: 72.5%  PVR: 161 dynes      Labs were reviewed including labs from other hospital systems through [facility]. Cardiac testing was reviewed including echos, nuclear scans, cardiac catheterization, including from other hospital systems through [facility]. Assessment:  1. PAH (pulmonary artery hypertension) ([facility]    2. Pure hypercholesterolemia    3. Essential hypertension    4. CREST syndrome    5. Raynaud's disease without gangrene    6. SOB (shortness of breath)    7. PAF (paroxysmal atrial fibrillation) ([facility] [address]    8. Vitamin D deficiency      1. Pulmonary artery hypertension: Stable. ProBNP 12/15/20> 147> 121> 168 (1/11/22)> 267 (8/2/22)  Taking valsartan-HCT & Toprol  -ECHO today 1/16/23 (I personally reviewed the results> EF 55-60%, RVSP 30mmHg, grade I DD  -ECHO 12/6/21 EF> 55%, grade I DD  -ECHO 8/28/2015> EF 55%, RVSP 35 mmHg.   -ECHO 12/16> EF 55%, RVSP 34mmHg.  -ECHO 12/2/19> EF 60% and RVSP 31mmHg.    -ECHO 12/4/2020> EF 65%     2. Essential hypertension: Stable. -h/o elevated HR low 100's at night while sleeping at times> awakens in a sweat.    - /60   Pulse 67   Ht 5' 5\" (1.651 m)   Wt 187 lb 12.8 oz (85.2 kg)   SpO2 98%   BMI 31.25 kg/m²       3. Hyperlipidemia: Taking Lipitor 10mg     4. PAF: Noted on event monitor 7/2022 (7% burden). Now f/w EP. -Taking Toprol and Eliquis  -EKG 7/18/22 (read & interpreted by me)> NSR 68     5. CREST syndrome: Per Dr. Andreea Walton. -[facility] 11/26/2012> Recommended starting Endothelin antagonist     6. Raynaud's disease without gangrene:  She experiences both red and white colored hands. These episodes are more frequent in the winter months. 7. Vitamin D deficiency:   Stable. Takes 1000u/day. Vit D 6/11/21> 42.2  Vit D 12/15/20> 40.8  Vit D 6/3/20> 40. 4  Vit D 12/5/19> 28.0       Plan:    1. No med changes  2. Fasting labs soon> CMP, BNP, CBC, lipid, Vit D  3. Return for follow up in 6 months    Time Based Itemization  A total of 40 minutes was spent on today's patient encounter. If applicable, non-patient-facing activities:  ( x)Preparing to see the patient and reviewing records  (x ) Individual interpretation of results  ( ) Discussion or coordination of care with other health care professionals  ( x) Ordering of unique tests, medications, or procedures  ( x) Documentation within the EHR    I appreciate the opportunity of cooperating in the care of this individual.    Neo Puga Allisonstana attestation: This note was scribed in the presence of Dr. Bin Puga MD, by Henna Olson RN. The scribe's documentation has been prepared under my direction and personally reviewed by me in its entirety. I confirm that the note above accurately reflects all work, treatment, procedures, and medical decision making performed by me. No

## 2024-04-02 ENCOUNTER — APPOINTMENT (OUTPATIENT)
Dept: ANTEPARTUM | Facility: CLINIC | Age: 34
End: 2024-04-02

## 2024-04-02 VITALS
SYSTOLIC BLOOD PRESSURE: 110 MMHG | RESPIRATION RATE: 18 BRPM | OXYGEN SATURATION: 97 % | DIASTOLIC BLOOD PRESSURE: 74 MMHG | HEART RATE: 73 BPM | TEMPERATURE: 99 F

## 2024-04-02 PROCEDURE — 85025 COMPLETE CBC W/AUTO DIFF WBC: CPT

## 2024-04-02 PROCEDURE — 86850 RBC ANTIBODY SCREEN: CPT

## 2024-04-02 PROCEDURE — 86901 BLOOD TYPING SEROLOGIC RH(D): CPT

## 2024-04-02 PROCEDURE — 86900 BLOOD TYPING SEROLOGIC ABO: CPT

## 2024-04-02 PROCEDURE — 59050 FETAL MONITOR W/REPORT: CPT

## 2024-04-02 PROCEDURE — 59025 FETAL NON-STRESS TEST: CPT

## 2024-04-02 PROCEDURE — 82962 GLUCOSE BLOOD TEST: CPT

## 2024-04-02 PROCEDURE — 86780 TREPONEMA PALLIDUM: CPT

## 2024-04-02 PROCEDURE — 36415 COLL VENOUS BLD VENIPUNCTURE: CPT

## 2024-04-02 PROCEDURE — 90707 MMR VACCINE SC: CPT

## 2024-04-02 RX ADMIN — Medication 600 MILLIGRAM(S): at 06:24

## 2024-04-02 RX ADMIN — SODIUM CHLORIDE 3 MILLILITER(S): 9 INJECTION INTRAMUSCULAR; INTRAVENOUS; SUBCUTANEOUS at 06:00

## 2024-04-02 RX ADMIN — Medication 1 TABLET(S): at 12:22

## 2024-04-02 RX ADMIN — Medication 600 MILLIGRAM(S): at 00:08

## 2024-04-02 RX ADMIN — Medication 0.5 MILLILITER(S): at 10:20

## 2024-04-02 RX ADMIN — Medication 600 MILLIGRAM(S): at 12:21

## 2024-04-02 NOTE — PROGRESS NOTE ADULT - ASSESSMENT
A/P 33y s/p , PPD# 2, stable, meeting postpartum milestones   - Pain: well controlled on tylenol/motrin  - GI: Tolerating regular diet  - : urinating without difficulty/pain  - DVT prophylaxis: ambulating frequently  - Dispo: PPD 2, unless otherwise specified

## 2024-04-02 NOTE — PROGRESS NOTE ADULT - SUBJECTIVE AND OBJECTIVE BOX
Patient evaluated at bedside this morning, resting comfortable in bed, no acute events overnight.  She reports pain is well controlled with tylenol and motrin.  She denies heavy vaginal bleeding. She has been ambulating without assistance, voiding spontaneously.  Tolerating food well, without nausea/vomit.      Physical Exam:  T(C): 36.5 (04-02-24 @ 06:17), Max: 36.7 (04-01-24 @ 22:04)  HR: 68 (04-02-24 @ 06:17) (68 - 73)  BP: 123/80 (04-02-24 @ 06:17) (105/67 - 123/80)  RR: 18 (04-02-24 @ 06:17) (18 - 18)  SpO2: 97% (04-02-24 @ 06:17) (97% - 98%)    GA: comfortable-appearing, NAD  Pulm: no increased work of breathing  Abd: soft, nontender, no rebound or guarding, uterus firm.  Extremities: no calf tenderness                          13.4   11.50 )-----------( 201      ( 31 Mar 2024 13:44 )             39.4           acetaminophen     Tablet .. 975 milliGRAM(s) Oral <User Schedule>  albuterol    90 MICROgram(s) HFA Inhaler 2 Puff(s) Inhalation every 6 hours PRN  benzocaine 20%/menthol 0.5% Spray 1 Spray(s) Topical every 6 hours PRN  budesonide 160 MICROgram(s)/formoterol 4.5 MICROgram(s) Inhaler 2 Puff(s) Inhalation two times a day  dibucaine 1% Ointment 1 Application(s) Topical every 6 hours PRN  diphenhydrAMINE 25 milliGRAM(s) Oral every 6 hours PRN  diphtheria/tetanus/pertussis (acellular) Vaccine (Adacel) 0.5 milliLiter(s) IntraMuscular once  fentanyl (2 MICROgram(s)/mL) + bupivacaine 0.0625%  in 0.9% Sodium Chloride PCEA 250 milliLiter(s) Epidural PCA Continuous  hydrocortisone 1% Cream 1 Application(s) Topical every 6 hours PRN  ibuprofen  Tablet. 600 milliGRAM(s) Oral every 6 hours  influenza   Vaccine 0.5 milliLiter(s) IntraMuscular once  lanolin Ointment 1 Application(s) Topical every 6 hours PRN  magnesium hydroxide Suspension 30 milliLiter(s) Oral two times a day PRN  oxyCODONE    IR 5 milliGRAM(s) Oral every 3 hours PRN  oxyCODONE    IR 5 milliGRAM(s) Oral once PRN  oxytocin Infusion 41.667 milliUNIT(s)/Min IV Continuous <Continuous>  oxytocin Infusion. 2 milliUNIT(s)/Min IV Continuous <Continuous>  pramoxine 1%/zinc 5% Cream 1 Application(s) Topical every 4 hours PRN  prenatal multivitamin 1 Tablet(s) Oral daily  simethicone 80 milliGRAM(s) Chew every 4 hours PRN  sodium chloride 0.9% lock flush 3 milliLiter(s) IV Push every 8 hours  tiotropium 2.5 MICROgram(s) Inhaler 2 Puff(s) Inhalation daily  witch hazel Pads 1 Application(s) Topical every 4 hours PRN

## 2024-04-07 DIAGNOSIS — Z3A.37 37 WEEKS GESTATION OF PREGNANCY: ICD-10-CM

## 2024-04-07 DIAGNOSIS — J45.909 UNSPECIFIED ASTHMA, UNCOMPLICATED: ICD-10-CM

## 2024-04-07 DIAGNOSIS — Z28.09 IMMUNIZATION NOT CARRIED OUT BECAUSE OF OTHER CONTRAINDICATION: ICD-10-CM

## 2024-04-07 DIAGNOSIS — Z23 ENCOUNTER FOR IMMUNIZATION: ICD-10-CM

## 2024-04-07 DIAGNOSIS — Z28.21 IMMUNIZATION NOT CARRIED OUT BECAUSE OF PATIENT REFUSAL: ICD-10-CM

## 2024-04-07 DIAGNOSIS — Z71.85 ENCOUNTER FOR IMMUNIZATION SAFETY COUNSELING: ICD-10-CM

## 2025-06-24 NOTE — OB RN PATIENT PROFILE - NS_CONTRACTPATTER_OBGYN_ALL_OB
[facility]  Therapy Contact Note      Date: 2025  Patient Name: Chelsey Juarez        MRN: [id number]    Account Number: [id number]  : 1983  (42 y.o.)  Gender: female         Set up [facility] for 7/10/25 930. Patient notified.     Priya Dixon, Rehab Tech    Irregular